# Patient Record
Sex: FEMALE | Race: WHITE | NOT HISPANIC OR LATINO | Employment: FULL TIME | URBAN - METROPOLITAN AREA
[De-identification: names, ages, dates, MRNs, and addresses within clinical notes are randomized per-mention and may not be internally consistent; named-entity substitution may affect disease eponyms.]

---

## 2021-08-25 ENCOUNTER — HOSPITAL ENCOUNTER (OUTPATIENT)
Dept: OTHER | Facility: HOSPITAL | Age: 41
Discharge: HOME | End: 2021-08-25

## 2023-03-25 ENCOUNTER — APPOINTMENT (OUTPATIENT)
Dept: MRI IMAGING | Facility: HOSPITAL | Age: 43
End: 2023-03-25
Payer: OTHER GOVERNMENT

## 2023-03-25 ENCOUNTER — HOSPITAL ENCOUNTER (OUTPATIENT)
Facility: HOSPITAL | Age: 43
Discharge: HOME | End: 2023-03-26
Attending: EMERGENCY MEDICINE | Admitting: STUDENT IN AN ORGANIZED HEALTH CARE EDUCATION/TRAINING PROGRAM
Payer: OTHER GOVERNMENT

## 2023-03-25 ENCOUNTER — APPOINTMENT (OUTPATIENT)
Dept: GENERAL RADIOLOGY | Facility: HOSPITAL | Age: 43
End: 2023-03-25
Payer: OTHER GOVERNMENT

## 2023-03-25 DIAGNOSIS — S76.111A QUADRICEPS TENDON RUPTURE, RIGHT, INITIAL ENCOUNTER: Primary | ICD-10-CM

## 2023-03-25 LAB
ANION GAP SERPL CALCULATED.3IONS-SCNC: 9.8 MMOL/L (ref 5–15)
BUN SERPL-MCNC: 10 MG/DL (ref 6–20)
BUN/CREAT SERPL: 12.8 (ref 7–25)
CALCIUM SPEC-SCNC: 9 MG/DL (ref 8.6–10.5)
CHLORIDE SERPL-SCNC: 101 MMOL/L (ref 98–107)
CO2 SERPL-SCNC: 24.2 MMOL/L (ref 22–29)
CREAT SERPL-MCNC: 0.78 MG/DL (ref 0.57–1)
DEPRECATED RDW RBC AUTO: 41.4 FL (ref 37–54)
EGFRCR SERPLBLD CKD-EPI 2021: 96.2 ML/MIN/1.73
ERYTHROCYTE [DISTWIDTH] IN BLOOD BY AUTOMATED COUNT: 11.9 % (ref 12.3–15.4)
GLUCOSE SERPL-MCNC: 140 MG/DL (ref 65–99)
HCT VFR BLD AUTO: 39 % (ref 34–46.6)
HGB BLD-MCNC: 13.9 G/DL (ref 12–15.9)
INR PPP: 1.04 (ref 0.86–1.15)
MCH RBC QN AUTO: 34.2 PG (ref 26.6–33)
MCHC RBC AUTO-ENTMCNC: 35.6 G/DL (ref 31.5–35.7)
MCV RBC AUTO: 95.8 FL (ref 79–97)
PLATELET # BLD AUTO: 289 10*3/MM3 (ref 140–450)
PMV BLD AUTO: 9.3 FL (ref 6–12)
POTASSIUM SERPL-SCNC: 3.7 MMOL/L (ref 3.5–5.2)
PROTHROMBIN TIME: 13.7 SECONDS (ref 11.8–14.9)
RBC # BLD AUTO: 4.07 10*6/MM3 (ref 3.77–5.28)
SODIUM SERPL-SCNC: 135 MMOL/L (ref 136–145)
WBC NRBC COR # BLD: 11.13 10*3/MM3 (ref 3.4–10.8)

## 2023-03-25 PROCEDURE — 96374 THER/PROPH/DIAG INJ IV PUSH: CPT

## 2023-03-25 PROCEDURE — G0378 HOSPITAL OBSERVATION PER HR: HCPCS

## 2023-03-25 PROCEDURE — 85610 PROTHROMBIN TIME: CPT | Performed by: STUDENT IN AN ORGANIZED HEALTH CARE EDUCATION/TRAINING PROGRAM

## 2023-03-25 PROCEDURE — 96361 HYDRATE IV INFUSION ADD-ON: CPT

## 2023-03-25 PROCEDURE — 96376 TX/PRO/DX INJ SAME DRUG ADON: CPT

## 2023-03-25 PROCEDURE — 99284 EMERGENCY DEPT VISIT MOD MDM: CPT

## 2023-03-25 PROCEDURE — 96375 TX/PRO/DX INJ NEW DRUG ADDON: CPT

## 2023-03-25 PROCEDURE — 25010000002 KETOROLAC TROMETHAMINE PER 15 MG: Performed by: STUDENT IN AN ORGANIZED HEALTH CARE EDUCATION/TRAINING PROGRAM

## 2023-03-25 PROCEDURE — 36415 COLL VENOUS BLD VENIPUNCTURE: CPT | Performed by: STUDENT IN AN ORGANIZED HEALTH CARE EDUCATION/TRAINING PROGRAM

## 2023-03-25 PROCEDURE — 80048 BASIC METABOLIC PNL TOTAL CA: CPT | Performed by: STUDENT IN AN ORGANIZED HEALTH CARE EDUCATION/TRAINING PROGRAM

## 2023-03-25 PROCEDURE — 73552 X-RAY EXAM OF FEMUR 2/>: CPT

## 2023-03-25 PROCEDURE — 25010000002 MORPHINE PER 10 MG: Performed by: EMERGENCY MEDICINE

## 2023-03-25 PROCEDURE — 25010000002 ONDANSETRON PER 1 MG: Performed by: EMERGENCY MEDICINE

## 2023-03-25 PROCEDURE — 85027 COMPLETE CBC AUTOMATED: CPT | Performed by: STUDENT IN AN ORGANIZED HEALTH CARE EDUCATION/TRAINING PROGRAM

## 2023-03-25 PROCEDURE — 73718 MRI LOWER EXTREMITY W/O DYE: CPT

## 2023-03-25 RX ORDER — NALOXONE HCL 0.4 MG/ML
0.4 VIAL (ML) INJECTION
Status: DISCONTINUED | OUTPATIENT
Start: 2023-03-25 | End: 2023-03-26

## 2023-03-25 RX ORDER — CYCLOBENZAPRINE HCL 10 MG
10 TABLET ORAL 3 TIMES DAILY PRN
Status: DISCONTINUED | OUTPATIENT
Start: 2023-03-25 | End: 2023-03-26 | Stop reason: HOSPADM

## 2023-03-25 RX ORDER — SODIUM CHLORIDE 0.9 % (FLUSH) 0.9 %
10 SYRINGE (ML) INJECTION EVERY 12 HOURS SCHEDULED
Status: DISCONTINUED | OUTPATIENT
Start: 2023-03-25 | End: 2023-03-26 | Stop reason: HOSPADM

## 2023-03-25 RX ORDER — ONDANSETRON 2 MG/ML
4 INJECTION INTRAMUSCULAR; INTRAVENOUS ONCE
Status: COMPLETED | OUTPATIENT
Start: 2023-03-25 | End: 2023-03-25

## 2023-03-25 RX ORDER — HYDROCODONE BITARTRATE AND ACETAMINOPHEN 5; 325 MG/1; MG/1
1 TABLET ORAL EVERY 4 HOURS PRN
Status: DISCONTINUED | OUTPATIENT
Start: 2023-03-25 | End: 2023-03-26 | Stop reason: HOSPADM

## 2023-03-25 RX ORDER — SODIUM CHLORIDE 0.9 % (FLUSH) 0.9 %
10 SYRINGE (ML) INJECTION AS NEEDED
Status: DISCONTINUED | OUTPATIENT
Start: 2023-03-25 | End: 2023-03-26 | Stop reason: HOSPADM

## 2023-03-25 RX ORDER — MORPHINE SULFATE 2 MG/ML
2 INJECTION, SOLUTION INTRAMUSCULAR; INTRAVENOUS EVERY 4 HOURS PRN
Status: DISCONTINUED | OUTPATIENT
Start: 2023-03-25 | End: 2023-03-26

## 2023-03-25 RX ORDER — ONDANSETRON 2 MG/ML
4 INJECTION INTRAMUSCULAR; INTRAVENOUS EVERY 6 HOURS PRN
Status: DISCONTINUED | OUTPATIENT
Start: 2023-03-25 | End: 2023-03-26 | Stop reason: HOSPADM

## 2023-03-25 RX ORDER — HYDROCODONE BITARTRATE AND ACETAMINOPHEN 5; 325 MG/1; MG/1
2 TABLET ORAL EVERY 4 HOURS PRN
Status: DISCONTINUED | OUTPATIENT
Start: 2023-03-25 | End: 2023-03-26 | Stop reason: HOSPADM

## 2023-03-25 RX ORDER — FAMOTIDINE 20 MG/1
40 TABLET, FILM COATED ORAL DAILY
Status: DISCONTINUED | OUTPATIENT
Start: 2023-03-25 | End: 2023-03-26 | Stop reason: HOSPADM

## 2023-03-25 RX ORDER — SODIUM CHLORIDE, SODIUM LACTATE, POTASSIUM CHLORIDE, CALCIUM CHLORIDE 600; 310; 30; 20 MG/100ML; MG/100ML; MG/100ML; MG/100ML
100 INJECTION, SOLUTION INTRAVENOUS CONTINUOUS
Status: DISCONTINUED | OUTPATIENT
Start: 2023-03-25 | End: 2023-03-26

## 2023-03-25 RX ORDER — SODIUM CHLORIDE 9 MG/ML
40 INJECTION, SOLUTION INTRAVENOUS AS NEEDED
Status: DISCONTINUED | OUTPATIENT
Start: 2023-03-25 | End: 2023-03-26 | Stop reason: HOSPADM

## 2023-03-25 RX ORDER — HYDROCODONE BITARTRATE AND ACETAMINOPHEN 7.5; 325 MG/1; MG/1
1 TABLET ORAL ONCE
Status: DISCONTINUED | OUTPATIENT
Start: 2023-03-25 | End: 2023-03-26 | Stop reason: HOSPADM

## 2023-03-25 RX ORDER — AMOXICILLIN 250 MG
1 CAPSULE ORAL NIGHTLY
Status: DISCONTINUED | OUTPATIENT
Start: 2023-03-25 | End: 2023-03-26 | Stop reason: HOSPADM

## 2023-03-25 RX ORDER — KETOROLAC TROMETHAMINE 15 MG/ML
15 INJECTION, SOLUTION INTRAMUSCULAR; INTRAVENOUS EVERY 6 HOURS PRN
Status: DISCONTINUED | OUTPATIENT
Start: 2023-03-25 | End: 2023-03-26 | Stop reason: HOSPADM

## 2023-03-25 RX ORDER — ACETAMINOPHEN 325 MG/1
650 TABLET ORAL EVERY 4 HOURS PRN
Status: DISCONTINUED | OUTPATIENT
Start: 2023-03-25 | End: 2023-03-26

## 2023-03-25 RX ORDER — DIPHENHYDRAMINE HCL 25 MG
25 CAPSULE ORAL NIGHTLY PRN
Status: DISCONTINUED | OUTPATIENT
Start: 2023-03-25 | End: 2023-03-26 | Stop reason: HOSPADM

## 2023-03-25 RX ADMIN — ONDANSETRON 4 MG: 2 INJECTION INTRAMUSCULAR; INTRAVENOUS at 17:33

## 2023-03-25 RX ADMIN — KETOROLAC TROMETHAMINE 15 MG: 15 INJECTION, SOLUTION INTRAMUSCULAR; INTRAVENOUS at 19:58

## 2023-03-25 RX ADMIN — MORPHINE SULFATE 4 MG: 4 INJECTION, SOLUTION INTRAMUSCULAR; INTRAVENOUS at 12:59

## 2023-03-25 RX ADMIN — FAMOTIDINE 40 MG: 20 TABLET, FILM COATED ORAL at 19:58

## 2023-03-25 RX ADMIN — ONDANSETRON 4 MG: 2 INJECTION INTRAMUSCULAR; INTRAVENOUS at 12:56

## 2023-03-25 RX ADMIN — SODIUM CHLORIDE, POTASSIUM CHLORIDE, SODIUM LACTATE AND CALCIUM CHLORIDE 100 ML/HR: 600; 310; 30; 20 INJECTION, SOLUTION INTRAVENOUS at 18:01

## 2023-03-25 RX ADMIN — Medication 10 ML: at 19:59

## 2023-03-25 NOTE — ED PROVIDER NOTES
"Time: 12:45 PM EDT  Date of encounter:  3/25/2023  Independent Historian/Clinical History and Information was obtained by:   Patient  Chief Complaint: Fall/Muscle Tear     History is limited by: N/A    History of Present Illness:  Patient is a 43 y.o. year old female who presents to the emergency department for evaluation after a fall earlier today. Pt was running for exercise earlier today when she felt a pop in her L hamstring and immediately felt pain in her R thigh and feeling \"balled up\". PT says that she feels like she tore a muscle in her R thigh.    HPI    Patient Care Team  Primary Care Provider: Maria Eugenia Bowles MD    Past Medical History:     No Known Allergies  History reviewed. No pertinent past medical history.  History reviewed. No pertinent surgical history.  History reviewed. No pertinent family history.    Home Medications:  Prior to Admission medications    Not on File        Social History:   Social History     Tobacco Use   • Smoking status: Never     Passive exposure: Never   • Smokeless tobacco: Never   Vaping Use   • Vaping Use: Never used   Substance Use Topics   • Alcohol use: Not Currently     Alcohol/week: 2.0 standard drinks     Types: 2 Glasses of wine per week   • Drug use: Defer         Review of Systems:  Review of Systems   Constitutional: Negative for chills and fever.   HENT: Negative for congestion, rhinorrhea and sore throat.    Eyes: Negative for photophobia.   Respiratory: Negative for apnea, cough, chest tightness and shortness of breath.    Cardiovascular: Negative for chest pain and palpitations.   Gastrointestinal: Negative for abdominal pain, diarrhea, nausea and vomiting.   Endocrine: Negative.    Genitourinary: Negative for difficulty urinating and dysuria.   Musculoskeletal: Negative for back pain, joint swelling and myalgias.   Skin: Negative for color change and wound.   Allergic/Immunologic: Negative.    Neurological: Negative for seizures and headaches. " "  Psychiatric/Behavioral: Negative.    All other systems reviewed and are negative.       Physical Exam:  /62 (BP Location: Right arm, Patient Position: Lying)   Pulse 50   Temp 98.2 °F (36.8 °C) (Oral)   Resp 16   Ht 162.6 cm (64\")   Wt 70.3 kg (155 lb)   LMP 03/25/2023   SpO2 99%   BMI 26.61 kg/m²     Physical Exam  Vitals and nursing note reviewed.   Constitutional:       General: She is awake.      Appearance: Normal appearance.   HENT:      Head: Normocephalic and atraumatic.      Nose: Nose normal.      Mouth/Throat:      Mouth: Mucous membranes are moist.   Eyes:      Extraocular Movements: Extraocular movements intact.      Pupils: Pupils are equal, round, and reactive to light.   Cardiovascular:      Rate and Rhythm: Normal rate and regular rhythm.      Heart sounds: Normal heart sounds.   Pulmonary:      Effort: Pulmonary effort is normal. No respiratory distress.      Breath sounds: Normal breath sounds. No wheezing, rhonchi or rales.   Abdominal:      General: Bowel sounds are normal.      Palpations: Abdomen is soft.      Tenderness: There is no abdominal tenderness. There is no guarding or rebound.      Comments: No rigidity   Musculoskeletal:         General: No swelling or tenderness. Normal range of motion.      Cervical back: Normal range of motion and neck supple.      Right upper leg: No bony tenderness.      Comments: Soft tissue deformity on distal thigh possible/suspected muscle tear   Skin:     General: Skin is warm and dry.      Coloration: Skin is not jaundiced.      Findings: Abrasion (L patella) present.      Comments: Abrasion on L patella with no bony tenderness    Neurological:      General: No focal deficit present.      Mental Status: She is alert and oriented to person, place, and time. Mental status is at baseline.      Sensory: Sensation is intact.      Motor: Motor function is intact.      Coordination: Coordination is intact.   Psychiatric:         Attention and " Perception: Attention and perception normal.         Mood and Affect: Mood and affect normal.         Speech: Speech normal.         Behavior: Behavior normal.         Judgment: Judgment normal.                  Procedures:  Procedures      Medical Decision Making:      Comorbidities that affect care:    None    External Notes reviewed:    None      The following orders were placed and all results were independently analyzed by me:  Orders Placed This Encounter   Procedures   • XR Femur 2 View Right   • XR Femur 2 View Left   • MRI Femur Right Without Contrast   • Basic Metabolic Panel   • CBC (No Diff)   • Protime-INR   • NPO Diet NPO Type: Strict NPO   • Diet: Regular/House Diet; Texture: Regular Texture (IDDSI 7); Fluid Consistency: Thin (IDDSI 0)   • Vital Signs   • Notify Provider (With Default Parameters)   • Intake & Output   • Weigh Patient   • Oral Care   • Saline Lock & Maintain IV Access   • Place Sequential Compression Device   • Maintain Sequential Compression Device   • Up With Assistance   • Neurovascular Checks   • Orthopedics (on-call MD unless specified)   • Oxygen Therapy- Nasal Cannula; Titrate for SPO2: 90% - 95%   • Insert Peripheral IV   • Insert Peripheral IV   • Initiate Observation Status       Medications Given in the Emergency Department:  Medications   sodium chloride 0.9 % flush 10 mL (has no administration in time range)   HYDROcodone-acetaminophen (NORCO) 7.5-325 MG per tablet 1 tablet (1 tablet Oral Not Given 3/25/23 1711)   sodium chloride 0.9 % flush 10 mL (has no administration in time range)   sodium chloride 0.9 % flush 10 mL (10 mL Intravenous Given 3/25/23 1959)   sodium chloride 0.9 % infusion 40 mL (has no administration in time range)   lactated ringers infusion (100 mL/hr Intravenous New Bag 3/25/23 1801)   acetaminophen (TYLENOL) tablet 650 mg (has no administration in time range)   morphine injection 2 mg (has no administration in time range)     And   naloxone (NARCAN)  injection 0.4 mg (has no administration in time range)   ondansetron (ZOFRAN) injection 4 mg (has no administration in time range)   diphenhydrAMINE (BENADRYL) capsule 25 mg (has no administration in time range)   HYDROcodone-acetaminophen (NORCO) 5-325 MG per tablet 1 tablet (has no administration in time range)   ketorolac (TORADOL) injection 15 mg (15 mg Intravenous Given 3/25/23 1958)   famotidine (PEPCID) tablet 40 mg (40 mg Oral Given 3/25/23 1958)   HYDROcodone-acetaminophen (NORCO) 5-325 MG per tablet 2 tablet (has no administration in time range)   sennosides-docusate (PERICOLACE) 8.6-50 MG per tablet 1 tablet (1 tablet Oral Not Given 3/25/23 2043)   cyclobenzaprine (FLEXERIL) tablet 10 mg (has no administration in time range)   morphine injection 4 mg (4 mg Intravenous Given 3/25/23 1259)   ondansetron (ZOFRAN) injection 4 mg (4 mg Intravenous Given 3/25/23 1256)   ondansetron (ZOFRAN) injection 4 mg (4 mg Intravenous Given 3/25/23 1733)        ED Course:    ED Course as of 03/25/23 2213   Sat Mar 25, 2023   1348 Images forwarded to Dr. Aguillon for orthopedic consultation. [RP]   1353 Case discussed with Dr. Aguillon.  He does recommend MRI of the affected right lower extremity.  Option for home with straight leg knee immobilizer versus inpatient if unable to ambulate.  Will reassess. [RP]      ED Course User Index  [RP] Sonu Bosch MD       Labs:    Lab Results (last 24 hours)     Procedure Component Value Units Date/Time    Basic Metabolic Panel [[id number]]  (Abnormal) Collected: 03/25/23 1745    Specimen: Blood Updated: 03/25/23 1819     Glucose 140 mg/dL      BUN 10 mg/dL      Creatinine 0.78 mg/dL      Sodium 135 mmol/L      Potassium 3.7 mmol/L      Chloride 101 mmol/L      CO2 24.2 mmol/L      Calcium 9.0 mg/dL      BUN/Creatinine Ratio 12.8     Anion Gap 9.8 mmol/L      eGFR 96.2 mL/min/1.73     Narrative:      GFR Normal >60  Chronic Kidney Disease <60  Kidney Failure <15      CBC (No Diff)  [[id number]]  (Abnormal) Collected: 03/25/23 1745    Specimen: Blood Updated: 03/25/23 1752     WBC 11.13 10*3/mm3      RBC 4.07 10*6/mm3      Hemoglobin 13.9 g/dL      Hematocrit 39.0 %      MCV 95.8 fL      MCH 34.2 pg      MCHC 35.6 g/dL      RDW 11.9 %      RDW-SD 41.4 fl      MPV 9.3 fL      Platelets 289 10*3/mm3     Protime-INR [[id number]]  (Normal) Collected: 03/25/23 1745    Specimen: Blood Updated: 03/25/23 1801     Protime 13.7 Seconds      INR 1.04    Narrative:      Suggested Therapeutic Ranges For Oral Anticoagulant Therapy:  Level of Therapy                      INR Target Range  Standard Dose                            2.0-3.0  High Dose                                2.5-3.5  Patients not receiving anticoagulant  Therapy Normal Range                     0.86-1.15           Imaging:    XR Femur 2 View Left    Result Date: 3/25/2023  PROCEDURE: XR FEMUR 2 VW LEFT  COMPARISON: [facility], , XR FEMUR 2 VW RIGHT, 3/25/2023, 13:07.  INDICATIONS: POST FALL, SUSPECTED LEFT HAMSTRING TEAR  FINDINGS:   Four views of both femurs were obtained.  No fractures are identified.  There are no lytic or sclerotic lesions.  IMPRESSION: No osseous abnormalities are identified in the left femur or right femur.   JILL SKINNER MD       Electronically Signed and Approved By: JILL SKINNER MD on 3/25/2023 at 13:24             XR Femur 2 View Right    Result Date: 3/25/2023  PROCEDURE: XR FEMUR 2 VW RIGHT  COMPARISON: [facility], , XR FEMUR 2 VW LEFT, 3/25/2023, 13:07.  INDICATIONS: POST FALL, SUSPECTED RIGHT QUADRICEP TEAR  FINDINGS:   Please see the report of the left femur performed the same day for a combined report.  JILL SKINNER MD       Electronically Signed and Approved By: JILL SKINNER MD on 3/25/2023 at 13:26             MRI Femur Right Without Contrast    Result Date: 3/25/2023  PROCEDURE: MRI FEMUR RIGHT WO CONTRAST  COMPARISON: [facility], , XR FEMUR 2  VW RIGHT, 3/25/2023, 13:07.  [facility], CR, XR FEMUR 2 VW LEFT, 3/25/2023, 13:07.  INDICATIONS: anterior to lateral mid shaft to right knee pain, posterior midshaft left thigh pain  TECHNIQUE: Multiplanar multisequence images of the right femur without contrast.   FINDINGS:  The left femur is visualized on the axial and coronal images of the right femur.  There appears to be left common hamstring origin tendon avulsion.  No evidence of marrow edema or fracture.  There is a right quadriceps tendon tear.  There appears to be a right VMO complex injury.  There is a myotendinous tear of the right rectus femoris muscle. The right ACL and PCL appear to be intact.  There is soft tissue swelling around the right knee.  Questionable undersurface tear of the posterior horn of the right medial meniscus.  The MCL and LCL complex appear to be intact.  IMPRESSION: 1. Right quadriceps tendon tear.  Myotendinous tear of the right rectus femoris muscle.  2. Left common hamstring tendon origin avulsion.  3. Questionable undersurface tear of the posterior horn of the right medial meniscus.   JILL SKINNER MD       Electronically Signed and Approved By: JILL SKINNER MD on 3/25/2023 at 16:35                 Differential Diagnosis and Discussion:    Extremity Pain: Differential diagnosis includes but is not limited to soft tissue sprain, tendonitis, tendon injury, dislocation, fracture, deep vein thrombosis, arterial insufficiency, osteoarthritis, bursitis, and ligamentous damage.    All X-rays were independently reviewed by me.    MDM         Patient Care Considerations:          Consultants/Shared Management Plan:    Consultant: I have discussed the case with Dr. Aguillon, orthopedics on-call who states Patient can be admitted under his name if she is unable to ambulate.    Social Determinants of Health:    Patient is independent, reliable, and has access to care.       Disposition and Care Coordination:    Admit:    Through independent evaluation of the patient's history, physical, and imperical data, the patient meets criteria for observation/admission to the hospital.        Final diagnoses:   Quadriceps tendon rupture, right, initial encounter        ED Disposition     ED Disposition   Decision to Admit    Condition   --    Comment   Level of Care: Med/Surg [1]   Diagnosis: Quadriceps tendon rupture, right, initial encounter [[id number]]               This medical record created using voice recognition software.           Michael Pinzon Jr.  03/25/23 1324       Sonu Bosch MD  03/25/23 2525

## 2023-03-25 NOTE — PLAN OF CARE
Goal Outcome Evaluation:   Patient arrived to 502, pain in bilateral legs when at rest of 5/10 sharp, constant, 10/10 when moving. Patient unable to move right leg R/T pain, however can move left. VSS. Alert and oriented. LR @100ml/hr.

## 2023-03-26 ENCOUNTER — READMISSION MANAGEMENT (OUTPATIENT)
Dept: CALL CENTER | Facility: HOSPITAL | Age: 43
End: 2023-03-26
Payer: OTHER GOVERNMENT

## 2023-03-26 ENCOUNTER — ANESTHESIA EVENT (OUTPATIENT)
Dept: PERIOP | Facility: HOSPITAL | Age: 43
End: 2023-03-26
Payer: OTHER GOVERNMENT

## 2023-03-26 ENCOUNTER — ANESTHESIA (OUTPATIENT)
Dept: PERIOP | Facility: HOSPITAL | Age: 43
End: 2023-03-26
Payer: OTHER GOVERNMENT

## 2023-03-26 VITALS
HEART RATE: 57 BPM | HEIGHT: 64 IN | BODY MASS INDEX: 26.46 KG/M2 | RESPIRATION RATE: 18 BRPM | OXYGEN SATURATION: 96 % | SYSTOLIC BLOOD PRESSURE: 108 MMHG | WEIGHT: 155 LBS | TEMPERATURE: 98 F | DIASTOLIC BLOOD PRESSURE: 57 MMHG

## 2023-03-26 LAB — B-HCG UR QL: NEGATIVE

## 2023-03-26 PROCEDURE — 99024 POSTOP FOLLOW-UP VISIT: CPT | Performed by: STUDENT IN AN ORGANIZED HEALTH CARE EDUCATION/TRAINING PROGRAM

## 2023-03-26 PROCEDURE — 25010000002 PROPOFOL 10 MG/ML EMULSION: Performed by: NURSE ANESTHETIST, CERTIFIED REGISTERED

## 2023-03-26 PROCEDURE — 25010000002 HYDROMORPHONE 1 MG/ML SOLUTION: Performed by: NURSE ANESTHETIST, CERTIFIED REGISTERED

## 2023-03-26 PROCEDURE — 25010000002 KETOROLAC TROMETHAMINE PER 15 MG: Performed by: STUDENT IN AN ORGANIZED HEALTH CARE EDUCATION/TRAINING PROGRAM

## 2023-03-26 PROCEDURE — 25010000002 KETOROLAC TROMETHAMINE PER 15 MG: Performed by: NURSE ANESTHETIST, CERTIFIED REGISTERED

## 2023-03-26 PROCEDURE — G0378 HOSPITAL OBSERVATION PER HR: HCPCS

## 2023-03-26 PROCEDURE — S0260 H&P FOR SURGERY: HCPCS | Performed by: STUDENT IN AN ORGANIZED HEALTH CARE EDUCATION/TRAINING PROGRAM

## 2023-03-26 PROCEDURE — 81025 URINE PREGNANCY TEST: CPT | Performed by: STUDENT IN AN ORGANIZED HEALTH CARE EDUCATION/TRAINING PROGRAM

## 2023-03-26 PROCEDURE — 25010000002 DEXAMETHASONE PER 1 MG: Performed by: NURSE ANESTHETIST, CERTIFIED REGISTERED

## 2023-03-26 PROCEDURE — 25010000002 MIDAZOLAM PER 1 MG: Performed by: NURSE ANESTHETIST, CERTIFIED REGISTERED

## 2023-03-26 PROCEDURE — 27385 REPAIR OF THIGH MUSCLE: CPT | Performed by: PHYSICIAN ASSISTANT

## 2023-03-26 PROCEDURE — 25010000002 CEFAZOLIN PER 500 MG: Performed by: NURSE ANESTHETIST, CERTIFIED REGISTERED

## 2023-03-26 PROCEDURE — 96361 HYDRATE IV INFUSION ADD-ON: CPT

## 2023-03-26 PROCEDURE — 96376 TX/PRO/DX INJ SAME DRUG ADON: CPT

## 2023-03-26 PROCEDURE — 25010000002 ONDANSETRON PER 1 MG: Performed by: STUDENT IN AN ORGANIZED HEALTH CARE EDUCATION/TRAINING PROGRAM

## 2023-03-26 PROCEDURE — 27385 REPAIR OF THIGH MUSCLE: CPT | Performed by: STUDENT IN AN ORGANIZED HEALTH CARE EDUCATION/TRAINING PROGRAM

## 2023-03-26 PROCEDURE — 25010000002 FENTANYL CITRATE (PF) 50 MCG/ML SOLUTION: Performed by: NURSE ANESTHETIST, CERTIFIED REGISTERED

## 2023-03-26 PROCEDURE — 25010000002 MORPHINE PER 10 MG: Performed by: STUDENT IN AN ORGANIZED HEALTH CARE EDUCATION/TRAINING PROGRAM

## 2023-03-26 DEVICE — KT BIOTENODESIS W/FW4PK: Type: IMPLANTABLE DEVICE | Site: FEMUR | Status: FUNCTIONAL

## 2023-03-26 DEVICE — SUT TP 1.7MM 38IN W/CUT NDL 1/2 CIR 76MM WHT/BLU: Type: IMPLANTABLE DEVICE | Site: FEMUR | Status: FUNCTIONAL

## 2023-03-26 RX ORDER — ONDANSETRON 2 MG/ML
4 INJECTION INTRAMUSCULAR; INTRAVENOUS ONCE AS NEEDED
Status: DISCONTINUED | OUTPATIENT
Start: 2023-03-26 | End: 2023-03-26 | Stop reason: HOSPADM

## 2023-03-26 RX ORDER — ONDANSETRON 4 MG/1
4 TABLET, FILM COATED ORAL EVERY 8 HOURS PRN
Qty: 30 TABLET | Refills: 0 | Status: SHIPPED | OUTPATIENT
Start: 2023-03-26 | End: 2023-03-26 | Stop reason: SDUPTHER

## 2023-03-26 RX ORDER — ACETAMINOPHEN 325 MG/1
650 TABLET ORAL EVERY 4 HOURS PRN
Status: DISCONTINUED | OUTPATIENT
Start: 2023-03-26 | End: 2023-03-26 | Stop reason: HOSPADM

## 2023-03-26 RX ORDER — KETAMINE HCL IN NACL, ISO-OSM 100MG/10ML
SYRINGE (ML) INJECTION AS NEEDED
Status: DISCONTINUED | OUTPATIENT
Start: 2023-03-26 | End: 2023-03-26 | Stop reason: SURG

## 2023-03-26 RX ORDER — FENTANYL CITRATE 50 UG/ML
INJECTION, SOLUTION INTRAMUSCULAR; INTRAVENOUS AS NEEDED
Status: DISCONTINUED | OUTPATIENT
Start: 2023-03-26 | End: 2023-03-26 | Stop reason: SURG

## 2023-03-26 RX ORDER — KETOROLAC TROMETHAMINE 30 MG/ML
INJECTION, SOLUTION INTRAMUSCULAR; INTRAVENOUS AS NEEDED
Status: DISCONTINUED | OUTPATIENT
Start: 2023-03-26 | End: 2023-03-26 | Stop reason: SURG

## 2023-03-26 RX ORDER — ONDANSETRON 4 MG/1
4 TABLET, FILM COATED ORAL EVERY 8 HOURS PRN
Qty: 30 TABLET | Refills: 0 | Status: SHIPPED | OUTPATIENT
Start: 2023-03-26

## 2023-03-26 RX ORDER — AMOXICILLIN 250 MG
1 CAPSULE ORAL NIGHTLY
Qty: 20 TABLET | Refills: 0 | Status: SHIPPED | OUTPATIENT
Start: 2023-03-26 | End: 2023-03-26 | Stop reason: SDUPTHER

## 2023-03-26 RX ORDER — CYCLOBENZAPRINE HCL 10 MG
10 TABLET ORAL 3 TIMES DAILY PRN
Qty: 30 TABLET | Refills: 0 | Status: SHIPPED | OUTPATIENT
Start: 2023-03-26 | End: 2023-03-26 | Stop reason: SDUPTHER

## 2023-03-26 RX ORDER — SODIUM CHLORIDE, SODIUM LACTATE, POTASSIUM CHLORIDE, CALCIUM CHLORIDE 600; 310; 30; 20 MG/100ML; MG/100ML; MG/100ML; MG/100ML
30 INJECTION, SOLUTION INTRAVENOUS CONTINUOUS
Status: DISCONTINUED | OUTPATIENT
Start: 2023-03-26 | End: 2023-03-26 | Stop reason: HOSPADM

## 2023-03-26 RX ORDER — KETOROLAC TROMETHAMINE 30 MG/ML
15 INJECTION, SOLUTION INTRAMUSCULAR; INTRAVENOUS EVERY 6 HOURS
Status: DISCONTINUED | OUTPATIENT
Start: 2023-03-26 | End: 2023-03-26 | Stop reason: HOSPADM

## 2023-03-26 RX ORDER — AMOXICILLIN 250 MG
1 CAPSULE ORAL NIGHTLY
Qty: 20 TABLET | Refills: 0 | Status: SHIPPED | OUTPATIENT
Start: 2023-03-26

## 2023-03-26 RX ORDER — CEFAZOLIN SODIUM 2 G/100ML
2 INJECTION, SOLUTION INTRAVENOUS
Status: DISCONTINUED | OUTPATIENT
Start: 2023-03-26 | End: 2023-03-26 | Stop reason: HOSPADM

## 2023-03-26 RX ORDER — CYCLOBENZAPRINE HCL 10 MG
10 TABLET ORAL 3 TIMES DAILY PRN
Qty: 30 TABLET | Refills: 0 | Status: SHIPPED | OUTPATIENT
Start: 2023-03-26 | End: 2023-04-05

## 2023-03-26 RX ORDER — SODIUM CHLORIDE, SODIUM LACTATE, POTASSIUM CHLORIDE, CALCIUM CHLORIDE 600; 310; 30; 20 MG/100ML; MG/100ML; MG/100ML; MG/100ML
100 INJECTION, SOLUTION INTRAVENOUS CONTINUOUS
Status: DISCONTINUED | OUTPATIENT
Start: 2023-03-26 | End: 2023-03-26 | Stop reason: HOSPADM

## 2023-03-26 RX ORDER — GLYCOPYRROLATE 0.2 MG/ML
INJECTION INTRAMUSCULAR; INTRAVENOUS AS NEEDED
Status: DISCONTINUED | OUTPATIENT
Start: 2023-03-26 | End: 2023-03-26 | Stop reason: SURG

## 2023-03-26 RX ORDER — OXYCODONE HYDROCHLORIDE 5 MG/1
5 TABLET ORAL
Status: DISCONTINUED | OUTPATIENT
Start: 2023-03-26 | End: 2023-03-26 | Stop reason: HOSPADM

## 2023-03-26 RX ORDER — ROCURONIUM BROMIDE 10 MG/ML
INJECTION, SOLUTION INTRAVENOUS AS NEEDED
Status: DISCONTINUED | OUTPATIENT
Start: 2023-03-26 | End: 2023-03-26 | Stop reason: SURG

## 2023-03-26 RX ORDER — NALOXONE HYDROCHLORIDE 4 MG/.1ML
SPRAY NASAL
Qty: 2 EACH | Refills: 0 | Status: SHIPPED | OUTPATIENT
Start: 2023-03-26

## 2023-03-26 RX ORDER — ONDANSETRON 4 MG/1
4 TABLET, FILM COATED ORAL EVERY 6 HOURS PRN
Status: DISCONTINUED | OUTPATIENT
Start: 2023-03-26 | End: 2023-03-26 | Stop reason: HOSPADM

## 2023-03-26 RX ORDER — MEPERIDINE HYDROCHLORIDE 25 MG/ML
12.5 INJECTION INTRAMUSCULAR; INTRAVENOUS; SUBCUTANEOUS
Status: DISCONTINUED | OUTPATIENT
Start: 2023-03-26 | End: 2023-03-26 | Stop reason: HOSPADM

## 2023-03-26 RX ORDER — DEXAMETHASONE SODIUM PHOSPHATE 4 MG/ML
INJECTION, SOLUTION INTRA-ARTICULAR; INTRALESIONAL; INTRAMUSCULAR; INTRAVENOUS; SOFT TISSUE AS NEEDED
Status: DISCONTINUED | OUTPATIENT
Start: 2023-03-26 | End: 2023-03-26 | Stop reason: SURG

## 2023-03-26 RX ORDER — LIDOCAINE HYDROCHLORIDE 20 MG/ML
INJECTION, SOLUTION EPIDURAL; INFILTRATION; INTRACAUDAL; PERINEURAL AS NEEDED
Status: DISCONTINUED | OUTPATIENT
Start: 2023-03-26 | End: 2023-03-26 | Stop reason: SURG

## 2023-03-26 RX ORDER — HYDROCODONE BITARTRATE AND ACETAMINOPHEN 5; 325 MG/1; MG/1
1 TABLET ORAL EVERY 4 HOURS PRN
Qty: 30 TABLET | Refills: 0 | Status: SHIPPED | OUTPATIENT
Start: 2023-03-26

## 2023-03-26 RX ORDER — PROMETHAZINE HYDROCHLORIDE 12.5 MG/1
25 TABLET ORAL ONCE AS NEEDED
Status: DISCONTINUED | OUTPATIENT
Start: 2023-03-26 | End: 2023-03-26 | Stop reason: HOSPADM

## 2023-03-26 RX ORDER — ONDANSETRON 2 MG/ML
4 INJECTION INTRAMUSCULAR; INTRAVENOUS EVERY 6 HOURS PRN
Status: DISCONTINUED | OUTPATIENT
Start: 2023-03-26 | End: 2023-03-26

## 2023-03-26 RX ORDER — SCOLOPAMINE TRANSDERMAL SYSTEM 1 MG/1
1 PATCH, EXTENDED RELEASE TRANSDERMAL CONTINUOUS
Status: DISCONTINUED | OUTPATIENT
Start: 2023-03-26 | End: 2023-03-26 | Stop reason: HOSPADM

## 2023-03-26 RX ORDER — CEFAZOLIN SODIUM 2 G/100ML
2 INJECTION, SOLUTION INTRAVENOUS EVERY 8 HOURS
Status: DISCONTINUED | OUTPATIENT
Start: 2023-03-26 | End: 2023-03-26 | Stop reason: HOSPADM

## 2023-03-26 RX ORDER — PROPOFOL 10 MG/ML
VIAL (ML) INTRAVENOUS AS NEEDED
Status: DISCONTINUED | OUTPATIENT
Start: 2023-03-26 | End: 2023-03-26 | Stop reason: SURG

## 2023-03-26 RX ORDER — HYDROCODONE BITARTRATE AND ACETAMINOPHEN 5; 325 MG/1; MG/1
1 TABLET ORAL EVERY 4 HOURS PRN
Qty: 30 TABLET | Refills: 0 | Status: SHIPPED | OUTPATIENT
Start: 2023-03-26 | End: 2023-03-26 | Stop reason: SDUPTHER

## 2023-03-26 RX ORDER — NALOXONE HCL 0.4 MG/ML
0.4 VIAL (ML) INJECTION
Status: DISCONTINUED | OUTPATIENT
Start: 2023-03-26 | End: 2023-03-26 | Stop reason: HOSPADM

## 2023-03-26 RX ORDER — FERROUS SULFATE 325(65) MG
325 TABLET ORAL
Status: DISCONTINUED | OUTPATIENT
Start: 2023-03-26 | End: 2023-03-26 | Stop reason: HOSPADM

## 2023-03-26 RX ORDER — PROMETHAZINE HYDROCHLORIDE 25 MG/1
25 SUPPOSITORY RECTAL ONCE AS NEEDED
Status: DISCONTINUED | OUTPATIENT
Start: 2023-03-26 | End: 2023-03-26 | Stop reason: HOSPADM

## 2023-03-26 RX ORDER — CEFAZOLIN SODIUM 1 G/3ML
INJECTION, POWDER, FOR SOLUTION INTRAMUSCULAR; INTRAVENOUS AS NEEDED
Status: DISCONTINUED | OUTPATIENT
Start: 2023-03-26 | End: 2023-03-26 | Stop reason: SURG

## 2023-03-26 RX ORDER — MIDAZOLAM HYDROCHLORIDE 1 MG/ML
INJECTION INTRAMUSCULAR; INTRAVENOUS AS NEEDED
Status: DISCONTINUED | OUTPATIENT
Start: 2023-03-26 | End: 2023-03-26 | Stop reason: SURG

## 2023-03-26 RX ORDER — ACETAMINOPHEN 650 MG/1
650 SUPPOSITORY RECTAL EVERY 4 HOURS PRN
Status: DISCONTINUED | OUTPATIENT
Start: 2023-03-26 | End: 2023-03-26 | Stop reason: HOSPADM

## 2023-03-26 RX ADMIN — SODIUM CHLORIDE, POTASSIUM CHLORIDE, SODIUM LACTATE AND CALCIUM CHLORIDE 100 ML/HR: 600; 310; 30; 20 INJECTION, SOLUTION INTRAVENOUS at 12:40

## 2023-03-26 RX ADMIN — CEFAZOLIN SODIUM 2 G: 1 INJECTION, POWDER, FOR SOLUTION INTRAMUSCULAR; INTRAVENOUS at 10:21

## 2023-03-26 RX ADMIN — CYCLOBENZAPRINE 10 MG: 10 TABLET, FILM COATED ORAL at 14:07

## 2023-03-26 RX ADMIN — FENTANYL CITRATE 50 MCG: 50 INJECTION, SOLUTION INTRAMUSCULAR; INTRAVENOUS at 10:17

## 2023-03-26 RX ADMIN — DEXAMETHASONE SODIUM PHOSPHATE 8 MG: 4 INJECTION, SOLUTION INTRA-ARTICULAR; INTRALESIONAL; INTRAMUSCULAR; INTRAVENOUS; SOFT TISSUE at 10:26

## 2023-03-26 RX ADMIN — MIDAZOLAM HYDROCHLORIDE 2 MG: 1 INJECTION, SOLUTION INTRAMUSCULAR; INTRAVENOUS at 10:05

## 2023-03-26 RX ADMIN — MORPHINE SULFATE 4 MG: 4 INJECTION, SOLUTION INTRAMUSCULAR; INTRAVENOUS at 12:40

## 2023-03-26 RX ADMIN — KETOROLAC TROMETHAMINE 15 MG: 15 INJECTION, SOLUTION INTRAMUSCULAR; INTRAVENOUS at 13:47

## 2023-03-26 RX ADMIN — Medication 10 ML: at 08:12

## 2023-03-26 RX ADMIN — SODIUM CHLORIDE, POTASSIUM CHLORIDE, SODIUM LACTATE AND CALCIUM CHLORIDE 100 ML/HR: 600; 310; 30; 20 INJECTION, SOLUTION INTRAVENOUS at 03:08

## 2023-03-26 RX ADMIN — SCOPALAMINE 1 PATCH: 1 PATCH, EXTENDED RELEASE TRANSDERMAL at 10:05

## 2023-03-26 RX ADMIN — FAMOTIDINE 40 MG: 20 TABLET, FILM COATED ORAL at 08:12

## 2023-03-26 RX ADMIN — HYDROCODONE BITARTRATE AND ACETAMINOPHEN 2 TABLET: 5; 325 TABLET ORAL at 12:40

## 2023-03-26 RX ADMIN — GLYCOPYRROLATE 0.2 MG: 0.2 INJECTION INTRAMUSCULAR; INTRAVENOUS at 10:05

## 2023-03-26 RX ADMIN — FERROUS SULFATE TAB 325 MG (65 MG ELEMENTAL FE) 325 MG: 325 (65 FE) TAB at 12:40

## 2023-03-26 RX ADMIN — ONDANSETRON 4 MG: 2 INJECTION INTRAMUSCULAR; INTRAVENOUS at 11:09

## 2023-03-26 RX ADMIN — SUGAMMADEX 200 MG: 100 INJECTION, SOLUTION INTRAVENOUS at 11:11

## 2023-03-26 RX ADMIN — LIDOCAINE HYDROCHLORIDE 100 MG: 20 INJECTION, SOLUTION EPIDURAL; INFILTRATION; INTRACAUDAL; PERINEURAL at 10:17

## 2023-03-26 RX ADMIN — ROCURONIUM BROMIDE 50 MG: 10 INJECTION, SOLUTION INTRAVENOUS at 10:18

## 2023-03-26 RX ADMIN — Medication 25 MG: at 10:22

## 2023-03-26 RX ADMIN — PROPOFOL 200 MG: 10 INJECTION, EMULSION INTRAVENOUS at 10:17

## 2023-03-26 RX ADMIN — FENTANYL CITRATE 50 MCG: 50 INJECTION, SOLUTION INTRAMUSCULAR; INTRAVENOUS at 11:17

## 2023-03-26 RX ADMIN — HYDROMORPHONE HYDROCHLORIDE 0.5 MG: 1 INJECTION, SOLUTION INTRAMUSCULAR; INTRAVENOUS; SUBCUTANEOUS at 12:12

## 2023-03-26 RX ADMIN — SODIUM CHLORIDE, POTASSIUM CHLORIDE, SODIUM LACTATE AND CALCIUM CHLORIDE: 600; 310; 30; 20 INJECTION, SOLUTION INTRAVENOUS at 10:43

## 2023-03-26 RX ADMIN — KETOROLAC TROMETHAMINE 30 MG: 30 INJECTION, SOLUTION INTRAMUSCULAR; INTRAVENOUS at 10:27

## 2023-03-26 NOTE — OUTREACH NOTE
Prep Survey    Flowsheet Row Responses   [facility] facility patient discharged from? [facility]   Is LACE score < 7 ? Yes   Eligibility Not Eligible   What are the reasons patient is not eligible? Other  [low risk for readmit]   Does the patient have one of the following disease processes/diagnoses(primary or secondary)? Other   Prep survey completed? Yes          Ginny BELTARN - Registered Nurse

## 2023-03-26 NOTE — ANESTHESIA PREPROCEDURE EVALUATION
Anesthesia Evaluation     Patient summary reviewed and Nursing notes reviewed   no history of anesthetic complications:  NPO Solid Status: > 8 hours  NPO Liquid Status: > 2 hours           Airway   Mallampati: I  TM distance: >3 FB  Neck ROM: full  No difficulty expected  Dental - normal exam     Pulmonary - negative pulmonary ROS and normal exam    breath sounds clear to auscultation  Cardiovascular - negative cardio ROS and normal exam  Exercise tolerance: good (4-7 METS)    Rhythm: regular  Rate: normal        Neuro/Psych- negative ROS  GI/Hepatic/Renal/Endo - negative ROS     Musculoskeletal (-) negative ROS    Abdominal    Substance History - negative use     OB/GYN negative ob/gyn ROS         Other - negative ROS       ROS/Med Hx Other: PAT Nursing Notes unavailable.                 Anesthesia Plan    ASA 1     general     (Patient understands anesthesia not responsible for dental damage.)  intravenous induction     Anesthetic plan, risks, benefits, and alternatives have been provided, discussed and informed consent has been obtained with: patient.  Pre-procedure education provided  Use of blood products discussed with patient  Consented to blood products.   Plan discussed with CRNA.        CODE STATUS:

## 2023-03-26 NOTE — OP NOTE
QUADRICEPS TENDON REPAIR  Procedure Report    Patient Name:  Breanna Corona  YOB: 1980    Date of Surgery:  3/26/2023     Indications: Breanna is a 43-year-old female who sustained a fall on 3/25 with a right quadriceps tendon rupture.  We discussed treatment options.  We discussed both operative and nonoperative management.  We discussed the risk, benefits, indications, and alternatives to a right quadriceps tendon repair.  She elected to proceed with surgery and consent was obtained.    Pre-op Diagnosis:   Quadriceps tendon rupture, right, initial encounter [S76.111A]       Post-Op Diagnosis Codes:     * Quadriceps tendon rupture, right, initial encounter [S76.111A]    Procedure/CPT® Codes:      Procedure(s):  QUADRICEPS TENDON REPAIR          Staff:  Surgeon(s):  Jaxon Aguillon MD    Assistant: VETO Lamar    Anesthesia: General with Block    Estimated Blood Loss: 50 mL    Implants:    Implant Name Type Inv. Item Serial No.  Lot No. LRB No. Used Action   SUT TP 1.7MM 38IN W/CUT NDL 1/2 CIR 76MM WHT/ECTOR - [id number] Implant SUT TP 1.7MM 38IN W/CUT NDL 1/2 CIR 76MM WHT/ECTOR  [id number] Right 2 Implanted   KT [employer] W/FW4PK - [id number] Implant KT BIOTENODESIS W/FW4PK  [employer] [id number] Right 1 Implanted   KT [employer]OTEN[employer] W/FW4PK - [id number] Implant KT BIOTENODESIS W/FW4PK  [employer] [id number] Right 1 Implanted       Specimen:          None        Findings: Full-thickness quadriceps tendon rupture    Complications: None    Description of Procedure: The patient was met in the preoperative holding area and the operative extremity was marked.  The patient was transferred to the operating room and laid supine on the operating table.  General anesthesia was induced.  2 g of preoperative Ancef were administered.  An upper thigh tourniquet was applied.  The right lower extremity was prepped and draped in the usual sterile fashion.  Timeout was taken ensure the appropriate  patient, procedure and procedural site.  All were in agreement.  The right lower extremity was exsanguinated and tourniquet was inflated to 300 mmHg.  I made a 8 cm longitudinal incision over the anterior knee.  Sharp dissection was carried down through the skin and subcutaneous tissues.  The quadriceps tendon rupture was identified.  This was full-thickness and extended into both the medial and lateral patellar retinacula.  The hemarthrosis was evacuated.  The knee was thoroughly irrigated with normal saline.  The tendon stump was debrided back to healthy appearing tissue.  The superior pole of the patella was debrided of tendon remnant in anticipation of repair.  I utilized the Ar[employer] 2.4 mm drill to place 3 antegrade drill holes through the patella in standard fashion.  I then utilized 1.7 mm suture tape in [place] fashion to place 2 a sending and descending limbs through the end of the quadriceps tendon.  Sutures were then shuttled through the drill holes.  The knee was brought into full extension.  Sutures were then tied distally with multiple locking half hitches.  Tendon reduced down to bone nicely with no palpable gaps at 45 degrees of knee flexion.  The knee was then thoroughly irrigated with normal saline.  The medial and lateral retinacular tissues were closed with #1 Ethibond.  Subcutaneous tissues were closed with 0 Vicryl and 2-0 Vicryl.  Skin was closed with skin staples.  The wound was dressed with Xeroform, soft roll, and an Ace wrap from the foot to the proximal thigh.  The tourniquet was released.  A knee immobilizer was applied.  All surgical counts were correct.  The patient was extubated and transferred to the recovery room under anesthesia guidance.    Assistant: VETO Lamar was present and her skilled assistance was necessary for patient positioning, retraction, suction, irrigation, suture management, closure, dressing application, brace application.      Jaxon Aguillon MD     Date:  3/26/2023  Time: 11:42 EDT

## 2023-03-26 NOTE — DISCHARGE INSTRUCTIONS
Orthopedic instructions: You may bear weight as tolerated on the left lower extremity.  Toe-touch weightbearing only on the right lower extremity.  Use your walker or crutches at all times.  Keep your knee immobilizer on and secured at all times.  You must maintain your knee in full extension at all times.  No knee flexion allowed on the right lower extremity.  Ice and elevate to help reduce pain and swelling.  You may remove the Ace wrap and gauze on postoperative day 2.  Keep your Aquacel dressing in place for 7 days.  You may shower over the Aquacel dressing.  Do not soak or scrub the bandage.  After 7 days, remove the Aquacel dressing and begin daily dressing changes to the right knee.  Monitor for increasing swelling, redness, drainage, fevers, chills.  Take Eliquis twice daily for DVT prophylaxis.  Once Eliquis is complete begin taking aspirin 325 mg daily.  Call the office with any concerns.  2-week follow-up for reevaluation.

## 2023-03-26 NOTE — NURSING NOTE
Ms. Corona, is post-op from Quadriceps tendon rupture repair. She has ambulated once, tolerating diet advancement yet complains of pain 10/10 in right leg. Patient requesting muscle relaxer in relation of muscle spasms.   Awaiting patient to ambulate second time and has flatus. Plan to get patient up to ambulate around 3pm.

## 2023-03-26 NOTE — ANESTHESIA POSTPROCEDURE EVALUATION
Patient: Breanna Corona    Procedure Summary     Date: 03/26/23 Room / Location: [facility] OR 03 / [facility] MAIN OR    Anesthesia Start: 1009 Anesthesia Stop: 1142    Procedure: QUADRICEPS TENDON REPAIR (Right: Thigh) Diagnosis:       Quadriceps tendon rupture, right, initial encounter      (Quadriceps tendon rupture, right, initial encounter [S76.111A])    Surgeons: Jaxon Aguillon MD Provider:     Anesthesia Type: general ASA Status: 1          Anesthesia Type: general    Vitals  Vitals Value Taken Time   /54 03/26/23 1224   Temp 36.7 °C (98 °F) 03/26/23 1224   Pulse 49 03/26/23 1225   Resp 16 03/26/23 1224   SpO2 96 % 03/26/23 1225   Vitals shown include unvalidated device data.        Post Anesthesia Care and Evaluation    Patient location during evaluation: bedside  Patient participation: complete - patient participated  Level of consciousness: awake  Pain management: adequate    Airway patency: patent  PONV Status: none  Cardiovascular status: acceptable and stable  Respiratory status: acceptable  Hydration status: acceptable    Comments: An Anesthesiologist personally participated in the most demanding procedures (including induction and emergence if applicable) in the anesthesia plan, monitored the course of anesthesia administration at frequent intervals and remained physically present and available for immediate diagnosis and treatment of emergencies.

## 2023-03-26 NOTE — H&P
[facility]   Consult Note    Patient Name: Breanna Corona  : 1980  MRN: [id number]  Primary Care Physician:  Maria Eugenia Bowles MD  Referring Physician: No ref. provider found  Date of admission: 3/25/2023    Subjective   Subjective     Chief Complaint: Bilateral leg pain, fall    HPI:  Breanna Corona is a 43 y.o. female who sustained a fall while running yesterday.  She reports she was sprinting and felt a pop in her left hamstring.  This caused her to fall onto her right knee.  She struck the anterior aspect of her right knee and felt a pop in her distal quad.  She was unable to ambulate after the fall.  She presented to [facility] ED.  X-rays were negative.  An MRI revealed a right quadriceps tendon tear and proximal hamstring tear on the left.  She was admitted given her inability to ambulate.  She currently denies any pain within the left hamstring and right quad.  She has bilateral knee abrasions.  She does not take any blood thinning medications regularly.    Review of Systems   14 Point ROS is negative except as noted above.     Personal History     History reviewed. No pertinent past medical history.    History reviewed. No pertinent surgical history.    Family History: family history is not on file. Otherwise pertinent FHx was reviewed and not pertinent to current issue.    Social History:  reports that she has never smoked. She has never been exposed to tobacco smoke. She has never used smokeless tobacco. She reports that she does not currently use alcohol after a past usage of about 2.0 standard drinks per week. Drug use questions deferred to the physician.    Home Medications:       Allergies:  No Known Allergies    Objective    Objective     Vitals:   Temp:  [97.9 °F (36.6 °C)-98.4 °F (36.9 °C)] 97.9 °F (36.6 °C)  Heart Rate:  [50-71] 58  Resp:  [16-18] 18  BP: (106-127)/(46-79) 114/52    Physical Exam:   Constitutional: Awake, alert   HENT: Atraumatic, Normocephalic   Respiratory:  Nonlabored respirations    Cardiovascular: Intact peripheral pulses    Musculoskeletal:     Right lower extremity: Abrasion over the anterior knee.  Palpable defect in the distal quadriceps tendon.  Moderate effusion.  Unable to maintain straight leg raise.  Knee stable to varus and valgus stress.  Nontender about the hip or thigh.  Calf soft and nontender distally.  Neurovascular intact with palpable pedal pulse.    Left lower extremity: Abrasion over the anterior knee.  Extensor mechanism intact.  No knee effusion.  Tender over the proximal hamstring.  Calf soft.  Distal neurovascular intact with palpable pedal pulse.     Imaging:  Imaging Results (Last 24 Hours)     Procedure Component Value Units Date/Time    MRI Femur Right Without Contrast [[id number]] Collected: 03/25/23 1635     Updated: 03/25/23 1638    Narrative:      PROCEDURE: MRI FEMUR RIGHT WO CONTRAST     COMPARISON: [facility], CR, XR FEMUR 2 VW RIGHT, 3/25/2023, 13:07.  [facility], CR, XR FEMUR 2 VW LEFT, 3/25/2023, 13:07.     INDICATIONS: anterior to lateral mid shaft to right knee pain, posterior midshaft left thigh pain     TECHNIQUE: Multiplanar multisequence images of the right femur without contrast.       FINDINGS:   The left femur is visualized on the axial and coronal images of the right femur.  There appears to   be left common hamstring origin tendon avulsion.  No evidence of marrow edema or fracture.  There   is a right quadriceps tendon tear.  There appears to be a right VMO complex injury.  There is a   myotendinous tear of the right rectus femoris muscle. The right ACL and PCL appear to be intact.    There is soft tissue swelling around the right knee.  Questionable undersurface tear of the   posterior horn of the right medial meniscus.  The MCL and LCL complex appear to be intact.     IMPRESSION:  1. Right quadriceps tendon tear.  Myotendinous tear of the right rectus femoris muscle.     2. Left common  hamstring tendon origin avulsion.     3. Questionable undersurface tear of the posterior horn of the right medial meniscus.       JILL SKINNER MD         Electronically Signed and Approved By: JILL SKINNER MD on 3/25/2023 at 16:35                     XR Femur 2 View Right [[id number]] Collected: 03/25/23 1325     Updated: 03/25/23 1329    Narrative:      PROCEDURE: XR FEMUR 2 VW RIGHT     COMPARISON: [facility], , XR FEMUR 2 VW LEFT, 3/25/2023, 13:07.     INDICATIONS: POST FALL, SUSPECTED RIGHT QUADRICEP TEAR     FINDINGS:      Please see the report of the left femur performed the same day for a combined report.     JILL SKINNER MD         Electronically Signed and Approved By: JILL SKINNER MD on 3/25/2023 at 13:26                     XR Femur 2 View Left [[id number]] Collected: 03/25/23 1324     Updated: 03/25/23 1327    Narrative:      PROCEDURE: XR FEMUR 2 VW LEFT     COMPARISON: [facility], , XR FEMUR 2 VW RIGHT, 3/25/2023, 13:07.     INDICATIONS: POST FALL, SUSPECTED LEFT HAMSTRING TEAR     FINDINGS:      Four views of both femurs were obtained.  No fractures are identified.  There are no lytic or   sclerotic lesions.     IMPRESSION:  No osseous abnormalities are identified in the left femur or right femur.       JILL SKINNER MD         Electronically Signed and Approved By: JILL SKINNER MD on 3/25/2023 at 13:24                            Result Review    Result Review:  I have personally reviewed the results from the time of this admission to 3/26/2023 07:14 EDT and agree with these findings:  [x]  Laboratory  []  Microbiology  [x]  Radiology  []  EKG/Telemetry   []  Cardiology/Vascular   []  Pathology  []  Old records  []  Other:      Assessment & Plan   Assessment / Plan     Brief Patient Summary:  Breanna Corona is a 43 y.o. female with a right quadriceps tendon tear and left proximal hamstring tear    Active Hospital Problems:  Active Hospital  Problems    Diagnosis    • **Quadriceps tendon rupture, right, initial encounter      Plan:     We discussed her injury and treatment options.  We discussed both operative and nonoperative management.  We discussed the risk, benefits, indications, and alternatives to a right quadriceps tendon repair.  We discussed the primary benefit of surgery is tendon reattachment.  We discussed surgery risk including bleeding, infection, damage to nerves and blood vessels, hardware complication, fracture, retear, stiffness, anesthesia risk including mortality, functional limitations, DVT/PE, and need for additional procedures.  She elected to proceed with surgical management.    We will plan nonoperative management of the left proximal hamstring avulsion.    N.p.o. for surgery today  Nonweightbearing right lower extremity  Ice and elevate  Preoperative antibiotics  Hold DVT chemoprophylaxis  Pain control    Electronically signed by Jaxon Aguillon MD, 03/26/23, 7:14 AM EDT.

## 2023-03-27 NOTE — DISCHARGE SUMMARY
[facility]         DISCHARGE SUMMARY    Patient Name: Breanna Corona  : 1980  MRN: [id number]    Date of Admission: 3/25/2023  Date of Discharge: 3/26/2023  Primary Care Physician: Maria Eugenia Bowles MD    Consults     No orders found from 2023 to 3/26/2023.          Presenting Problem:   Quadriceps tendon rupture, right, initial encounter [S76.111A]    Active and Resolved Hospital Problems:  Active Hospital Problems    Diagnosis POA   • **Quadriceps tendon rupture, right, initial encounter [S76.111A] Yes      Resolved Hospital Problems   No resolved problems to display.         Hospital Course     Hospital Course:  Breanna Corona is a 43 y.o. female who sustained a right quadriceps tendon rupture and left proximal hamstring avulsion on 3/25/2023.  She was admitted to the hospital due to difficulty with ambulation after her injury.  She underwent uncomplicated right quadriceps tendon repair on 3/26/2023.  Postoperatively her pain was well controlled, she demonstrated safety with ambulation using her crutches and knee immobilizer.  She voided difficulty.  She ultimately discharged home with oral pain medication and 2 weeks of Eliquis prescribed.        DISCHARGE Follow Up Recommendations: 2-week orthopedic follow-up for reevaluation.      Day of Discharge     Vital Signs:  Temp:  [97.4 °F (36.3 °C)-98 °F (36.7 °C)] 98 °F (36.7 °C)  Heart Rate:  [44-72] 57  Resp:  [14-18] 18  BP: (108-124)/(54-71) 108/57  Flow (L/min):  [8] 8  Physical Exam:  General: Alert, no acute distress  Cardiac: Intact peripheral pulses  Pulmonary nonlabored respirations  Right lower extremity: Intact surgical dressings.  No drainage.  Knee immobilizer in place.  Distal neurovascular intact palpable pedal pulse.      Pertinent  and/or Most Recent Results     LAB RESULTS:      Lab 23   WBC 11.13*   HEMOGLOBIN 13.9   HEMATOCRIT 39.0   PLATELETS 289   MCV 95.8   PROTIME 13.7         Lab 23    SODIUM 135*   POTASSIUM 3.7   CHLORIDE 101   CO2 24.2   ANION GAP 9.8   BUN 10   CREATININE 0.78   EGFR 96.2   GLUCOSE 140*   CALCIUM 9.0             Lab 03/25/23  1745   PROTIME 13.7   INR 1.04                 Brief Urine Lab Results  (Last result in the past 365 days)      Color   Clarity   Blood   Leuk Est   Nitrite   Protein   CREAT   Urine HCG        03/26/23 0928               Negative           Microbiology Results (last 10 days)     ** No results found for the last 240 hours. **                           Labs Pending at Discharge: None        Discharge Details        Discharge Medications      New Medications      Instructions Start Date   apixaban 2.5 MG tablet tablet  Commonly known as: ELIQUIS   2.5 mg, Oral, 2 Times Daily      cyclobenzaprine 10 MG tablet  Commonly known as: FLEXERIL   10 mg, Oral, 3 Times Daily PRN      HYDROcodone-acetaminophen 5-325 MG per tablet  Commonly known as: NORCO   1 tablet, Oral, Every 4 Hours PRN      naloxone 4 MG/0.1ML nasal spray  Commonly known as: NARCAN   CALL 911. Do not prime. Spray into nostril upon signs of opioid overdose. May repeat in 2 to 3 minutes in opposite nostril if no or minimal breathing and responsiveness, then as needed (if doses are available) every 2 to 3 minutes.      ondansetron 4 MG tablet  Commonly known as: Zofran   4 mg, Oral, Every 8 Hours PRN      sennosides-docusate 8.6-50 MG per tablet  Commonly known as: PERICOLACE   1 tablet, Oral, Nightly             No Known Allergies      Discharge Disposition:  Home or Self Care    Diet:  Hospital:No active diet order        Discharge Activity:   Activity Instructions    Do not stand on right leg.              CODE STATUS:  There are no questions and answers to display.         No future appointments.    Additional Instructions for the Follow-ups that You Need to Schedule     Discharge Follow-up with Specified Provider: Jaxon Aguillon MD; 2 Weeks   As directed      To: Jaxon Aguillon MD    Follow Up:  2 Weeks    Follow Up Details: Call to confirm appointment               Electronically signed by Jaxon Aguillon MD, 03/27/23, 7:52 AM EDT.

## 2023-03-30 ENCOUNTER — TELEPHONE (OUTPATIENT)
Dept: ORTHOPEDIC SURGERY | Facility: CLINIC | Age: 43
End: 2023-03-30
Payer: OTHER GOVERNMENT

## 2023-03-30 DIAGNOSIS — S76.111A QUADRICEPS TENDON RUPTURE, RIGHT, INITIAL ENCOUNTER: Primary | ICD-10-CM

## 2023-03-30 NOTE — TELEPHONE ENCOUNTER
Caller: CHLOE   Relationship to Patient: SELF   Phone Number: [phone]   Reason for Call: PATIENT CALLING ASKING FOR PT ORDERS, THEY NEED TO GET AUTH FROM [facility]

## 2023-03-30 NOTE — TELEPHONE ENCOUNTER
Spoke with patient, she will start physical therapy 2 weeks after surgery. Order is placed, she would like to go to [facility] and needs a  referral. Can you please work of referral for Pt

## 2023-04-10 ENCOUNTER — OFFICE VISIT (OUTPATIENT)
Dept: ORTHOPEDIC SURGERY | Facility: CLINIC | Age: 43
End: 2023-04-10
Payer: OTHER GOVERNMENT

## 2023-04-10 VITALS — WEIGHT: 155 LBS | BODY MASS INDEX: 26.46 KG/M2 | HEIGHT: 64 IN

## 2023-04-10 DIAGNOSIS — S76.111D RUPTURE OF RIGHT QUADRICEPS TENDON, SUBSEQUENT ENCOUNTER: Primary | ICD-10-CM

## 2023-04-10 PROCEDURE — 99024 POSTOP FOLLOW-UP VISIT: CPT | Performed by: PHYSICIAN ASSISTANT

## 2023-04-10 NOTE — PROGRESS NOTES
"Chief Complaint  Pain and Follow-up of the Right Knee and Suture / Staple Removal    Subjective          Breanna Corona presents to [facility]   History of Present Illness    Breanna Corona presents today for a follow-up of her right knee.  Patient is 2 weeks postop right quadriceps tendon repair performed by Dr. Aguillon on 3/26/2023.  Today, patient states that she is doing okay.  She reports no pain.  She is no longer taking any narcotic medications.  She is scheduled to begin formal physical therapy on Wednesday.  She has remained in her knee immobilizer and nonweightbearing since surgery.  She denies fever or chills.  Denies complications, redness, or drainage from her incision.  She has been doing some gentle ankle range of motion exercises.  She is currently on Eliquis for DVT prophylaxis.  Denies lower extremity numbness or tingling.  She affirms swelling.      No Known Allergies     Social History     Socioeconomic History   • Marital status:    Tobacco Use   • Smoking status: Never     Passive exposure: Never   • Smokeless tobacco: Never   Vaping Use   • Vaping Use: Never used   Substance and Sexual Activity   • Alcohol use: Not Currently     Alcohol/week: 2.0 standard drinks     Types: 2 Glasses of wine per week   • Drug use: Defer   • Sexual activity: Defer        I reviewed the patient's chief complaint, history of present illness, review of systems, past medical history, surgical history, family history, social history, medications, and allergy list.     REVIEW OF SYSTEMS    Constitutional: Denies fevers, chills, weight loss  Cardiovascular: Denies chest pain, shortness of breath  Skin: Denies rashes, acute skin changes  Neurologic: Denies headache, loss of consciousness  MSK: Right knee pain      Objective   Vital Signs:   Ht 162.6 cm (64\")   Wt 70.3 kg (155 lb)   BMI 26.61 kg/m²     Body mass index is 26.61 kg/m².    Physical Exam    General: Alert. No " acute distress.   Right lower extremity: Staples removed today without complications.  Incision is well-healing with no active drainage or redness.  No concerning signs for infection.  Knee stable to varus and valgus stress.  Calf soft, nontender.  Demonstrates active ankle plantarflexion and dorsiflexion.  Palpable pedal pulses.    Procedures    Imaging Results (Most Recent)     None                 Assessment and Plan    Diagnoses and all orders for this visit:    1. Rupture of right quadriceps tendon, subsequent encounter (Primary)        Breanna Corona presents today 2 weeks postop right quadriceps tendon repair performed by Dr. Aguillon on 3/26/2023.  Staples removed today without complications.  Incision is well-healing with no concerning signs for infection.  Patient denies fever or chills. Incision site care was reviewed today. Patient instructed not to soak or submerge incision. Do not apply any lotions, creams, or ointments to the incision at this time.  We discussed concerning signs and symptoms regarding the incision site.  Patient understood and agreed.  Patient scheduled to begin formal physical therapy on Wednesday.  We discussed the quadriceps tendon repair protocol.  Patient will follow these guidelines.  She was placed in a hinged knee brace today, locked in full extension.  She instructed to gradually increase to toe-touch weightbearing with the knee brace locked in extension using crutches for assistance.  Patient will increase weightbearing as tolerated with formal physical therapy as she progresses.  We discussed the importance of working on some knee range of motion but not obtaining any knee flexion past 90 degrees until at least 6 weeks postop.  We discussed no active quadriceps extension at this time.  Patient expressed understanding.  Patient will finish Eliquis today and begin aspirin for DVT prophylaxis.  Use ice and elevation as needed for inflammation.      Patient will follow up in  4 weeks for reevaluation.        Call or return if symptoms worsen or patient has any concerns.       Follow Up   Return in about 4 weeks (around 5/8/2023).  Patient was given instructions and counseling regarding her condition or for health maintenance advice. Please see specific information pulled into the AVS if appropriate.     Clau Gutierrez PA-C  04/10/23  10:30 EDT

## 2023-05-12 ENCOUNTER — TELEPHONE (OUTPATIENT)
Dept: ORTHOPEDIC SURGERY | Facility: CLINIC | Age: 43
End: 2023-05-12

## 2023-05-12 ENCOUNTER — OFFICE VISIT (OUTPATIENT)
Dept: ORTHOPEDIC SURGERY | Facility: CLINIC | Age: 43
End: 2023-05-12
Payer: OTHER GOVERNMENT

## 2023-05-12 VITALS — BODY MASS INDEX: 26.46 KG/M2 | HEIGHT: 64 IN | WEIGHT: 155 LBS

## 2023-05-12 DIAGNOSIS — S76.111D RUPTURE OF RIGHT QUADRICEPS TENDON, SUBSEQUENT ENCOUNTER: Primary | ICD-10-CM

## 2023-05-12 NOTE — PROGRESS NOTES
"Chief Complaint  Follow-up of the Right Knee    Subjective          Breanna Corona presents to [facility]   History of Present Illness    Breanna Corona presents today for a follow-up of her right knee.  Patient is 7 weeks postop right quadriceps tendon repair performed on 3/26/2023.  Today, patient states that she is doing well.  She has been attending physical therapy and doing her home exercises.  She reports improving range of motion.  She did begin light strengthening exercises week without complication.  She reports continued knee flexion.  She states her incision is well-healed with no concerning signs for infection.  She denies new injuries.  Denies lower extremity numbness or tingling.  She is continue with her hinged knee brace for ambulation and mobility of the time.      No Known Allergies     Social History     Socioeconomic History   • Marital status:    Tobacco Use   • Smoking status: Never     Passive exposure: Never   • Smokeless tobacco: Never   Vaping Use   • Vaping Use: Never used   Substance and Sexual Activity   • Alcohol use: Not Currently     Alcohol/week: 2.0 standard drinks     Types: 2 Glasses of wine per week   • Drug use: Defer   • Sexual activity: Defer        I reviewed the patient's chief complaint, history of present illness, review of systems, past medical history, surgical history, family history, social history, medications, and allergy list.     REVIEW OF SYSTEMS    Constitutional: Denies fevers, chills, weight loss  Cardiovascular: Denies chest pain, shortness of breath  Skin: Denies rashes, acute skin changes  Neurologic: Denies headache, loss of consciousness  MSK: Right knee pain      Objective   Vital Signs:   Ht 162.6 cm (64\")   Wt 70.3 kg (155 lb)   BMI 26.61 kg/m²     Body mass index is 26.61 kg/m².    Physical Exam    General: Alert. No acute distress.   Right lower extremity: Incision is well-healed.  No concerning signs for " infection.  No knee effusion.  Full knee extension.  Knee flexion 105. Able to activate quad. Quad atrophy noted. Knee extensor mechanism intact.  Nontender to the medial or lateral joint lines.  Knee stable to varus and valgus stress.  Calf soft, nontender.  Demonstrates active ankle plantarflexion dorsiflexion.  Sensation intact over the dorsal and plantar foot.  Palpable pedal pulses.    Procedures    Imaging Results (Most Recent)     None                 Assessment and Plan    Diagnoses and all orders for this visit:    1. Rupture of right quadriceps tendon, subsequent encounter (Primary)        Breanna Corona presents today 6 weeks postop right quadriceps tendon repair performed on 3/26/2023.  Incision is well-healed with no concerning signs for infection.  Patient instructed to continue with formal physical therapy as well as her home exercises.  We discussed continuing the quadriceps tendon repair protocol.  Patient expressed understanding and agreed.  Patient was given a playmaker brace today. Quad atrophy noted with quad to calf ratio. Okay to transition from her hinged knee brace to a playmaker brace as tolerated.  He denies elevation as needed for inflammation.      Patient will follow up in 6 weeks for reevaluation.        Call or return if symptoms worsen or patient has any concerns.       Follow Up   Return in about 6 weeks (around 6/23/2023).  Patient was given instructions and counseling regarding her condition or for health maintenance advice. Please see specific information pulled into the AVS if appropriate.     Clau Gutierrez PA-C  05/12/23  11:17 EDT

## 2023-05-12 NOTE — TELEPHONE ENCOUNTER
Provider: JOSEPH DAMON    Caller: ASMITA    Relationship to Patient: [facility]DS    Phone Number: [phone]    Reason for Call: ONLY RECEIVED 8 OF 9 PAGES FROM OFFICE NOTES 5/12.

## 2023-06-19 ENCOUNTER — OFFICE VISIT (OUTPATIENT)
Dept: ORTHOPEDIC SURGERY | Facility: CLINIC | Age: 43
End: 2023-06-19
Payer: OTHER GOVERNMENT

## 2023-06-19 VITALS — OXYGEN SATURATION: 99 % | HEART RATE: 73 BPM | HEIGHT: 64 IN | WEIGHT: 154.98 LBS | BODY MASS INDEX: 26.46 KG/M2

## 2023-06-19 DIAGNOSIS — S76.111D RUPTURE OF RIGHT QUADRICEPS TENDON, SUBSEQUENT ENCOUNTER: Primary | ICD-10-CM

## 2023-06-19 NOTE — PROGRESS NOTES
Chief Complaint  Pain and Follow-up of the Right Knee    Subjective          Breanna Corona presents to [facility]   History of Present Illness    Breanna Corona presents today for a follow-up of her right knee.  Patient is 3 months postop right quadriceps tendon repair performed on 3/26/2023.  Today, patient states that she is doing well.  She has continued physical therapy and continues to make progression.  She states that she wears her brace at times, typically with yard work or other activities.  She has been progressing with stairs and lunges.  She states that she began some jumping today in physical therapy without complications.  She does experience some stiffness after sitting with her knee flexed for an extended period of time.  She will experience swelling after being on her feet for an extended period of time.  She does describe some catching with certain twisting motions on occasion.  She reports no new injuries.  She states that her strength continues to improve.      No Known Allergies     Social History     Socioeconomic History   • Marital status:    Tobacco Use   • Smoking status: Never     Passive exposure: Never   • Smokeless tobacco: Never   Vaping Use   • Vaping Use: Never used   Substance and Sexual Activity   • Alcohol use: Not Currently     Alcohol/week: 2.0 standard drinks     Types: 2 Glasses of wine per week   • Drug use: Defer   • Sexual activity: Defer        I reviewed the patient's chief complaint, history of present illness, review of systems, past medical history, surgical history, family history, social history, medications, and allergy list.     REVIEW OF SYSTEMS    Constitutional: Denies fevers, chills, weight loss  Cardiovascular: Denies chest pain, shortness of breath  Skin: Denies rashes, acute skin changes  Neurologic: Denies headache, loss of consciousness  MSK: Right knee pain      Objective   Vital Signs:   Pulse 73   Ht 162.6 cm  "(64\")   Wt 70.3 kg (154 lb 15.7 oz)   SpO2 99%   BMI 26.60 kg/m²     Body mass index is 26.6 kg/m².    Physical Exam    General: Alert. No acute distress.   Right lower extremity: Well-healed incision.  No concerning signs for infection.  No knee effusion.  Nontender to medial or lateral joint lines.  Full knee extension.  Knee flexion 120.  Knee extensor mechanism intact.  Able to activate quad muscle.  Knee stable to varus and valgus stress.  Calf soft, nontender.  Demonstrates active ankle plantarflexion and dorsiflexion.  Sensation intact over the dorsal and plantar foot.  Palpable pedal pulses.    Procedures    Imaging Results (Most Recent)     None                 Assessment and Plan    Diagnoses and all orders for this visit:    1. Rupture of right quadriceps tendon, subsequent encounter (Primary)        Breanna Corona presents today 3 months postop right quadriceps tendon repair performed on 3/26/2023.  Incision is well-healed with no concerning signs for infection.  Patient instructed to continue with formal physical therapy as well as her home exercises.  We discussed gradual progression with activity, continuing to follow-up with the quadriceps tendon repair protocol.  Patient expressed understanding and agreed.  Continue with playmaker brace as needed.  Use ice or elevation as needed for inflammation.      Patient will follow up in 6 weeks for reevaluation.      Call or return if symptoms worsen or patient has any concerns.       Follow Up   Return in about 6 weeks (around 7/31/2023).  Patient was given instructions and counseling regarding her condition or for health maintenance advice. Please see specific information pulled into the AVS if appropriate.     Clau Gutierrez PA-C  06/19/23  15:56 EDT        "

## 2023-12-04 ENCOUNTER — OFFICE VISIT (OUTPATIENT)
Dept: ORTHOPEDIC SURGERY | Facility: CLINIC | Age: 43
End: 2023-12-04
Payer: OTHER GOVERNMENT

## 2023-12-04 VITALS
WEIGHT: 155 LBS | HEIGHT: 64 IN | HEART RATE: 70 BPM | DIASTOLIC BLOOD PRESSURE: 85 MMHG | OXYGEN SATURATION: 98 % | BODY MASS INDEX: 26.46 KG/M2 | SYSTOLIC BLOOD PRESSURE: 147 MMHG

## 2023-12-04 DIAGNOSIS — Z47.89 ORTHOPEDIC AFTERCARE: Primary | ICD-10-CM

## 2023-12-04 PROCEDURE — 99213 OFFICE O/P EST LOW 20 MIN: CPT | Performed by: STUDENT IN AN ORGANIZED HEALTH CARE EDUCATION/TRAINING PROGRAM

## 2023-12-04 NOTE — PROGRESS NOTES
"Chief Complaint  Follow-up and Pain of the Right Knee    Subjective          Breanna Corona presents to [facility] for   History of Present Illness    The patient presents here today for follow up evaluation of the right knee.  Patient is postop right quadriceps tendon repair performed on 3/26/2023. She reports she is having some popping in her knee. She gets swelling at times. She does have anterior knee pain with extension.       No Known Allergies     Social History     Socioeconomic History    Marital status:    Tobacco Use    Smoking status: Never     Passive exposure: Never    Smokeless tobacco: Never   Vaping Use    Vaping Use: Never used   Substance and Sexual Activity    Alcohol use: Not Currently     Alcohol/week: 2.0 standard drinks of alcohol     Types: 2 Glasses of wine per week    Drug use: Defer    Sexual activity: Defer        I reviewed the patient's chief complaint, history of present illness, review of systems, past medical history, surgical history, family history, social history, medications, and allergy list.     REVIEW OF SYSTEMS    Constitutional: Denies fevers, chills, weight loss  Cardiovascular: Denies chest pain, shortness of breath  Skin: Denies rashes, acute skin changes  Neurologic: Denies headache, loss of consciousness  MSK: Right knee pain      Objective   Vital Signs:   /85   Pulse 70   Ht 162.6 cm (64\")   Wt 70.3 kg (155 lb)   SpO2 98%   BMI 26.61 kg/m²     Body mass index is 26.61 kg/m².    Physical Exam    General: Alert. No acute distress.   Right knee- full extension. No lag. 5/5 knee extension. Flexion 120 degrees. Stable to varus/valgus stress. Stable to anterior/posterior drawer. Negative Lachman's. Medial tenderness. Intact ankle plantar flexion and dorsiflexion. Positive EHL, FHL, GS and TA. Sensation intact to all 5 nerves of the foot. Positive pulses. Well healed scar. Pain with Alanna's.     Procedures    Imaging " Results (Most Recent)       None                     Assessment and Plan        No results found.     Diagnoses and all orders for this visit:    1. S/P right quadriceps tendon repair performed on 3/26/2023 (Primary)  -     MRI Knee Right Without Contrast; Future        Discussed the treatment plan with the patient.  Plan for repeat MRI of the right knee to re-evaluate the meniscus. The patient expressed understanding and wished to proceed. Plan to continue home exercises      Call or return if worsening symptoms.    Scribed for Jaxon Aguillon MD by Nae Espitia  12/04/2023   10:55 EST         Follow Up       MRI results.     Patient was given instructions and counseling regarding her condition or for health maintenance advice. Please see specific information pulled into the AVS if appropriate.       I have personally performed the services described in this document as scribed by the above individual and it is both accurate and complete.     Jaxon Aguillon MD  12/04/23  11:07 EST

## 2023-12-14 ENCOUNTER — HOSPITAL ENCOUNTER (OUTPATIENT)
Dept: MRI IMAGING | Facility: HOSPITAL | Age: 43
Discharge: HOME | End: 2023-12-14
Admitting: STUDENT IN AN ORGANIZED HEALTH CARE EDUCATION/TRAINING PROGRAM
Payer: OTHER GOVERNMENT

## 2023-12-14 DIAGNOSIS — Z47.89 ORTHOPEDIC AFTERCARE: ICD-10-CM

## 2023-12-14 PROCEDURE — 73721 MRI JNT OF LWR EXTRE W/O DYE: CPT

## 2023-12-15 ENCOUNTER — TELEPHONE (OUTPATIENT)
Dept: ORTHOPEDIC SURGERY | Facility: CLINIC | Age: 43
End: 2023-12-15
Payer: OTHER GOVERNMENT

## 2023-12-22 ENCOUNTER — OFFICE VISIT (OUTPATIENT)
Dept: ORTHOPEDIC SURGERY | Facility: CLINIC | Age: 43
End: 2023-12-22
Payer: OTHER GOVERNMENT

## 2023-12-22 VITALS
DIASTOLIC BLOOD PRESSURE: 77 MMHG | WEIGHT: 155 LBS | OXYGEN SATURATION: 97 % | SYSTOLIC BLOOD PRESSURE: 118 MMHG | BODY MASS INDEX: 26.46 KG/M2 | HEIGHT: 64 IN | HEART RATE: 61 BPM

## 2023-12-22 DIAGNOSIS — M94.261 CHONDROMALACIA OF RIGHT KNEE: ICD-10-CM

## 2023-12-22 DIAGNOSIS — Z47.89 ORTHOPEDIC AFTERCARE: Primary | ICD-10-CM

## 2023-12-22 NOTE — PROGRESS NOTES
"Chief Complaint  Follow-up and Pain of the Right Knee    Subjective          Breanna Corona presents to [facility] for   History of Present Illness    Breanna returns for follow-up of her right knee.  She had a quadriceps tendon repair performed on 3/26/2023.  She has progressed well with PT but had persistent pain.  An MRI was obtained.    No Known Allergies     Social History     Socioeconomic History    Marital status:    Tobacco Use    Smoking status: Never     Passive exposure: Never    Smokeless tobacco: Never   Vaping Use    Vaping Use: Never used   Substance and Sexual Activity    Alcohol use: Not Currently     Alcohol/week: 2.0 standard drinks of alcohol     Types: 2 Glasses of wine per week    Drug use: Defer    Sexual activity: Defer        I reviewed the patient's chief complaint, history of present illness, review of systems, past medical history, surgical history, family history, social history, medications, and allergy list.     REVIEW OF SYSTEMS    Constitutional: Denies fevers, chills, weight loss  Cardiovascular: Denies chest pain, shortness of breath  Skin: Denies rashes, acute skin changes  Neurologic: Denies headache, loss of consciousness  MSK: Right knee pain      Objective   Vital Signs:   /77   Pulse 61   Ht 162.6 cm (64\")   Wt 70.3 kg (155 lb)   SpO2 97%   BMI 26.61 kg/m²     Body mass index is 26.61 kg/m².    Physical Exam    General: Alert. No acute distress.   Right lower extremity: full extension. No lag. 5/5 knee extension. Flexion 120 degrees. Stable to varus/valgus stress. Stable to anterior/posterior drawer. Negative Lachman's. Medial tenderness. Intact ankle plantar flexion and dorsiflexion. Positive EHL, FHL, GS and TA. Sensation intact to all 5 nerves of the foot. Positive pulses. Well healed scar.     Procedures    Imaging Results (Most Recent)       None                     Assessment and Plan        MRI Knee Right Without " Contrast    Result Date: 12/14/2023  Narrative: PROCEDURE: MRI KNEE RIGHT  WO CONTRAST  COMPARISON: [facility], MR, MRI FEMUR RIGHT WO CONTRAST, 3/25/2023, 15:19.  INDICATIONS: RIGHT KNEE SWELLING WITH CLICKING AND POPPING SENSATION. STATUS POST QUADRICEP TENDON AND MENISCUS REPAIR 03/2023.      TECHNIQUE: A complete multi-planar MRI was performed.   FINDINGS:  Bone marrow signal intensity is normal.  No osseous contusions or fractures.  No aggressive osseous lesions.  Physiologic joint fluid.  No intra-articular loose body.  There is mild medial compartment joint space narrowing.  The anterior cruciate ligament posterior cruciate ligament are intact.  The medial collateral ligament and lateral collateral ligament complex are intact.  There are no meniscal tears.  Patella has anatomic position.  There is evidence of previous quadriceps tendon repair with mild thickening distal quadriceps tendon but no defect identified.  The patellar tendon is thickened but intact.  There is diffuse thinning of cartilage in the medial compartment.  There is partial-thickness cartilage loss at the medial patellar facet.  There is no Chang cyst.  No evidence of recent Baker cyst rupture.  The popliteus muscle and tendon are normal appearance.  The iliotibial band is normal appearance.  There is no soft tissue mass.      Impression:   1. Findings of quadriceps tendon repair.  Mild thickening but no disruption of fibers. 2. Patellar tendinosis. 3. Mild medial compartment joint space narrowing. 4. Mild chondromalacia patellofemoral compartment.     GODFREY MCDANIELS MD       Electronically Signed and Approved By: GODFREY MCDANIELS MD on 12/14/2023 at 16:33               Diagnoses and all orders for this visit:    1. S/P right quadriceps tendon repair performed on 3/26/2023 (Primary)    2. Chondromalacia of right knee        We reviewed her MRI.  Her quadriceps tendon appears fully healed.  She does have chondromalacia throughout the  knee.  No meniscal tear identified.  There is also patellar tendinopathy.  We discussed treatment options.  We discussed various injections.  She is interested in PRP.  We will seek approval and she will return for a PRP injection in the right knee.      Call or return if worsening symptoms.    Scribed for Jaxon Aguillon MD by Maria Luisa Caraballo  12/22/2023   10:26 EST         Follow Up       PRP right knee    Patient was given instructions and counseling regarding her condition or for health maintenance advice. Please see specific information pulled into the AVS if appropriate.       I have personally performed the services described in this document as scribed by the above individual and it is both accurate and complete.     Jaxon Aguillon MD  12/22/23  12:09 EST

## 2024-01-08 ENCOUNTER — TELEPHONE (OUTPATIENT)
Dept: ORTHOPEDIC SURGERY | Facility: CLINIC | Age: 44
End: 2024-01-08
Payer: OTHER GOVERNMENT

## 2024-01-08 NOTE — TELEPHONE ENCOUNTER
[facility] received a fax that requires your attention. The document has been indexed to the patient’s chart for your review.      Reason for sending: [employer]ASMA AUTH    Documents Description: [employer] AUTH-DR CLOUD-[id number]    Name of Sender: SUSAN    Date Indexed: 1.8.24    Notes (if needed):

## 2024-01-12 ENCOUNTER — TELEPHONE (OUTPATIENT)
Dept: ORTHOPEDIC SURGERY | Facility: CLINIC | Age: 44
End: 2024-01-12
Payer: OTHER GOVERNMENT

## 2024-01-12 NOTE — TELEPHONE ENCOUNTER
APPT:  1-31 AT 2:30 PM RT KNEE PRP INJ WITH DR CLOUD/SHARAN.  PT REQ AFTERNOON APPT.    PER PT, NO RECENT INJ.     ENTERED AUTH #

## 2024-01-12 NOTE — TELEPHONE ENCOUNTER
----- Message from Maria Luisa Caraballo sent at 1/9/2024  9:54 AM EST -----  Regarding: PRP  PLEASE SCHEDULE PATIENT FOR A RIGHT KNEE PRP INJECTION WITH DR CLOUD.  SUSAN MEAD SCANNED INTO CHART.

## 2024-01-31 ENCOUNTER — OFFICE VISIT (OUTPATIENT)
Dept: ORTHOPEDIC SURGERY | Facility: CLINIC | Age: 44
End: 2024-01-31
Payer: OTHER GOVERNMENT

## 2024-01-31 VITALS — BODY MASS INDEX: 26.46 KG/M2 | HEART RATE: 64 BPM | OXYGEN SATURATION: 98 % | HEIGHT: 64 IN | WEIGHT: 155 LBS

## 2024-01-31 DIAGNOSIS — Z47.89 ORTHOPEDIC AFTERCARE: Primary | ICD-10-CM

## 2024-01-31 DIAGNOSIS — M94.261 CHONDROMALACIA OF RIGHT KNEE: ICD-10-CM

## 2024-01-31 NOTE — PROGRESS NOTES
"Chief Complaint  Follow-up and Pain of the Right Knee    Subjective          Breanna Corona presents to [facility] for   History of Present Illness    The patient presents here today for follow up evaluation of the right knee. The patient has been treating her right knee osteoarthritis conservatively. She is here today for a PRP injection today. To review, She had a quadriceps tendon repair performed on 3/26/2023.     No Known Allergies     Social History     Socioeconomic History    Marital status:    Tobacco Use    Smoking status: Never     Passive exposure: Never    Smokeless tobacco: Never   Vaping Use    Vaping Use: Never used   Substance and Sexual Activity    Alcohol use: Not Currently     Alcohol/week: 2.0 standard drinks of alcohol     Types: 2 Glasses of wine per week    Drug use: Defer    Sexual activity: Defer        I reviewed the patient's chief complaint, history of present illness, review of systems, past medical history, surgical history, family history, social history, medications, and allergy list.     REVIEW OF SYSTEMS    Constitutional: Denies fevers, chills, weight loss  Cardiovascular: Denies chest pain, shortness of breath  Skin: Denies rashes, acute skin changes  Neurologic: Denies headache, loss of consciousness  MSK: right knee pain      Objective   Vital Signs:   Pulse 64   Ht 162.6 cm (64\")   Wt 70.3 kg (155 lb)   SpO2 98%   BMI 26.61 kg/m²     Body mass index is 26.61 kg/m².    Physical Exam    General: Alert. No acute distress.   Right lower extremity: full extension. No lag. 5/5 knee extension. Flexion 120 degrees. Stable to varus/valgus stress. Stable to anterior/posterior drawer. Negative Lachman's. Medial tenderness. Intact ankle plantar flexion and dorsiflexion. Positive EHL, FHL, GS and TA. Sensation intact to all 5 nerves of the foot. Positive pulses. Well healed scar.      Prp right knee: R knee  Date/Time: 1/31/2024 3:05 PM  Consent " given by: patient  Site marked: site marked  Timeout: Immediately prior to procedure a time out was called to verify the correct patient, procedure, equipment, support staff and site/side marked as required   Supporting Documentation  Indications: pain   Procedure Details  Location: knee - R knee  Needle gauge: 21g.  Patient tolerance: patient tolerated the procedure well with no immediate complications      Imaging Results (Most Recent)       None                     Assessment and Plan        No results found.     Diagnoses and all orders for this visit:    1. S/P right quadriceps tendon repair performed on 3/26/2023 (Primary)    2. Chondromalacia of right knee        Discussed the treatment plan with the patient.  Discussed the risks and benefits of a right knee PRP injection. The patient expressed understanding and wished to proceed. She tolerated the procedure well. Plan to continue exercises and medication.     Call or return if worsening symptoms.    Scribed for Jaxon Aguillon MD by Nae Espitia  01/31/2024   14:34 EST         Follow Up       3 months    Patient was given instructions and counseling regarding her condition or for health maintenance advice. Please see specific information pulled into the AVS if appropriate.       I have personally performed the services described in this document as scribed by the above individual and it is both accurate and complete.     Jaxon Aguillon MD  01/31/24  14:36 EST

## 2024-08-22 ENCOUNTER — OFFICE VISIT (OUTPATIENT)
Dept: FAMILY MEDICINE CLINIC | Facility: CLINIC | Age: 44
End: 2024-08-22
Payer: OTHER GOVERNMENT

## 2024-08-22 ENCOUNTER — LAB (OUTPATIENT)
Dept: LAB | Facility: HOSPITAL | Age: 44
End: 2024-08-22
Payer: OTHER GOVERNMENT

## 2024-08-22 VITALS
HEIGHT: 64 IN | BODY MASS INDEX: 29.19 KG/M2 | SYSTOLIC BLOOD PRESSURE: 145 MMHG | WEIGHT: 171 LBS | HEART RATE: 55 BPM | DIASTOLIC BLOOD PRESSURE: 65 MMHG | OXYGEN SATURATION: 100 %

## 2024-08-22 DIAGNOSIS — Z13.220 LIPID SCREENING: ICD-10-CM

## 2024-08-22 DIAGNOSIS — N80.03 ADENOMYOSIS OF THE UTERUS: ICD-10-CM

## 2024-08-22 DIAGNOSIS — K21.9 GASTROESOPHAGEAL REFLUX DISEASE, UNSPECIFIED WHETHER ESOPHAGITIS PRESENT: Primary | ICD-10-CM

## 2024-08-22 DIAGNOSIS — R63.5 WEIGHT GAIN: ICD-10-CM

## 2024-08-22 DIAGNOSIS — D72.829 LEUKOCYTOSIS, UNSPECIFIED TYPE: ICD-10-CM

## 2024-08-22 DIAGNOSIS — R03.0 WHITE COAT SYNDROME WITHOUT DIAGNOSIS OF HYPERTENSION: ICD-10-CM

## 2024-08-22 DIAGNOSIS — R73.09 ELEVATED GLUCOSE: ICD-10-CM

## 2024-08-22 DIAGNOSIS — Z12.31 BREAST CANCER SCREENING BY MAMMOGRAM: ICD-10-CM

## 2024-08-22 DIAGNOSIS — R22.1 NECK FULLNESS: ICD-10-CM

## 2024-08-22 DIAGNOSIS — Z12.11 COLON CANCER SCREENING: ICD-10-CM

## 2024-08-22 DIAGNOSIS — R13.10 DYSPHAGIA, UNSPECIFIED TYPE: ICD-10-CM

## 2024-08-22 LAB
ALBUMIN SERPL-MCNC: 4.7 G/DL (ref 3.5–5.2)
ALBUMIN/GLOB SERPL: 1.8 G/DL
ALP SERPL-CCNC: 52 U/L (ref 39–117)
ALT SERPL W P-5'-P-CCNC: 24 U/L (ref 1–33)
ANION GAP SERPL CALCULATED.3IONS-SCNC: 12 MMOL/L (ref 5–15)
AST SERPL-CCNC: 25 U/L (ref 1–32)
BASOPHILS # BLD AUTO: 0.1 10*3/MM3 (ref 0–0.2)
BASOPHILS NFR BLD AUTO: 1.5 % (ref 0–1.5)
BILIRUB SERPL-MCNC: 0.5 MG/DL (ref 0–1.2)
BUN SERPL-MCNC: 12 MG/DL (ref 6–20)
BUN/CREAT SERPL: 14 (ref 7–25)
CALCIUM SPEC-SCNC: 9.5 MG/DL (ref 8.6–10.5)
CHLORIDE SERPL-SCNC: 101 MMOL/L (ref 98–107)
CHOLEST SERPL-MCNC: 177 MG/DL (ref 0–200)
CO2 SERPL-SCNC: 24 MMOL/L (ref 22–29)
CREAT SERPL-MCNC: 0.86 MG/DL (ref 0.57–1)
DEPRECATED RDW RBC AUTO: 42.3 FL (ref 37–54)
EGFRCR SERPLBLD CKD-EPI 2021: 85 ML/MIN/1.73
EOSINOPHIL # BLD AUTO: 0.1 10*3/MM3 (ref 0–0.4)
EOSINOPHIL NFR BLD AUTO: 1.5 % (ref 0.3–6.2)
ERYTHROCYTE [DISTWIDTH] IN BLOOD BY AUTOMATED COUNT: 11.8 % (ref 12.3–15.4)
GLOBULIN UR ELPH-MCNC: 2.6 GM/DL
GLUCOSE SERPL-MCNC: 88 MG/DL (ref 65–99)
HBA1C MFR BLD: 5.3 % (ref 4.8–5.6)
HCT VFR BLD AUTO: 44 % (ref 34–46.6)
HDLC SERPL-MCNC: 78 MG/DL (ref 40–60)
HGB BLD-MCNC: 15.1 G/DL (ref 12–15.9)
IMM GRANULOCYTES # BLD AUTO: 0.04 10*3/MM3 (ref 0–0.05)
IMM GRANULOCYTES NFR BLD AUTO: 0.6 % (ref 0–0.5)
LDLC SERPL CALC-MCNC: 90 MG/DL (ref 0–100)
LDLC/HDLC SERPL: 1.15 {RATIO}
LYMPHOCYTES # BLD AUTO: 2.13 10*3/MM3 (ref 0.7–3.1)
LYMPHOCYTES NFR BLD AUTO: 32 % (ref 19.6–45.3)
MCH RBC QN AUTO: 33.5 PG (ref 26.6–33)
MCHC RBC AUTO-ENTMCNC: 34.3 G/DL (ref 31.5–35.7)
MCV RBC AUTO: 97.6 FL (ref 79–97)
MONOCYTES # BLD AUTO: 0.52 10*3/MM3 (ref 0.1–0.9)
MONOCYTES NFR BLD AUTO: 7.8 % (ref 5–12)
NEUTROPHILS NFR BLD AUTO: 3.76 10*3/MM3 (ref 1.7–7)
NEUTROPHILS NFR BLD AUTO: 56.6 % (ref 42.7–76)
NRBC BLD AUTO-RTO: 0 /100 WBC (ref 0–0.2)
PLATELET # BLD AUTO: 334 10*3/MM3 (ref 140–450)
PMV BLD AUTO: 10.2 FL (ref 6–12)
POTASSIUM SERPL-SCNC: 3.9 MMOL/L (ref 3.5–5.2)
PROT SERPL-MCNC: 7.3 G/DL (ref 6–8.5)
RBC # BLD AUTO: 4.51 10*6/MM3 (ref 3.77–5.28)
SODIUM SERPL-SCNC: 137 MMOL/L (ref 136–145)
T4 FREE SERPL-MCNC: 1.33 NG/DL (ref 0.92–1.68)
TRIGL SERPL-MCNC: 46 MG/DL (ref 0–150)
TSH SERPL DL<=0.05 MIU/L-ACNC: 1.47 UIU/ML (ref 0.27–4.2)
VLDLC SERPL-MCNC: 9 MG/DL (ref 5–40)
WBC NRBC COR # BLD AUTO: 6.65 10*3/MM3 (ref 3.4–10.8)

## 2024-08-22 PROCEDURE — 84439 ASSAY OF FREE THYROXINE: CPT

## 2024-08-22 PROCEDURE — 85025 COMPLETE CBC W/AUTO DIFF WBC: CPT

## 2024-08-22 PROCEDURE — 83036 HEMOGLOBIN GLYCOSYLATED A1C: CPT

## 2024-08-22 PROCEDURE — 84443 ASSAY THYROID STIM HORMONE: CPT

## 2024-08-22 PROCEDURE — 36415 COLL VENOUS BLD VENIPUNCTURE: CPT

## 2024-08-22 PROCEDURE — 80061 LIPID PANEL: CPT

## 2024-08-22 PROCEDURE — 80053 COMPREHEN METABOLIC PANEL: CPT

## 2024-08-22 RX ORDER — OMEPRAZOLE 20 MG/1
20 CAPSULE, DELAYED RELEASE ORAL DAILY
Qty: 30 CAPSULE | Refills: 0 | Status: SHIPPED | OUTPATIENT
Start: 2024-08-22

## 2024-08-22 RX ORDER — CHLORAL HYDRATE 500 MG
CAPSULE ORAL
COMMUNITY

## 2024-08-22 RX ORDER — CETIRIZINE HYDROCHLORIDE 10 MG/1
10 TABLET ORAL DAILY
COMMUNITY

## 2024-08-22 RX ORDER — ERGOCALCIFEROL (VITAMIN D2) 10 MCG
400 TABLET ORAL DAILY
COMMUNITY

## 2024-08-22 RX ORDER — MULTIPLE VITAMINS W/ MINERALS TAB 9MG-400MCG
1 TAB ORAL DAILY
COMMUNITY

## 2024-08-22 NOTE — PROGRESS NOTES
Chief Complaint  Establish Care and Difficulty Swallowing    SUBJECTIVE  Breanna Corona presents to [facility] to establish care. Pt recently moved here from [place]. Pt states she is having difficulty swallowing.  States when she swallows and after she swallows she feels like it is stuck, has actually had food get stuck a few times and had to eventually vomit.  Patient does admit to off-and-on issues with reflux and it has been more frequent recently.  Does not take any medication for this.  Onset symptoms 1 month, but occasionally for the last 3-4 months if her head is turned to the side and she swallows she can feel a click sensation    Patient also reports that she has a constant feeling of fullness or pressure in her throat.    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 No pertinent past medical history.   Family History   Problem Relation Age of Onset    Cancer Father         No      Past Surgical History:   Procedure Laterality Date    QUADRICEPS TENDON REPAIR Right 3/26/2023    Procedure: QUADRICEPS TENDON REPAIR;  Surgeon: Jaxon Aguillon MD;  Location: [facility];  Service: Orthopedics;  Laterality: Right;        Current Outpatient Medications:     cetirizine (zyrTEC) 10 MG tablet, Take 1 tablet by mouth Daily., Disp: , Rfl:     Magnesium Hydroxide (MAGNESIA PO), Take  by mouth., Disp: , Rfl:     multivitamin with minerals tablet tablet, Take 1 tablet by mouth Daily., Disp: , Rfl:     Omega-3 Fatty Acids (fish oil) 1000 MG capsule capsule, Take  by mouth Daily With Breakfast., Disp: , Rfl:     Vitamin D,  "Cholecalciferol, (CHOLECALCIFEROL) 10 MCG (400 UNIT) tablet, Take 1 tablet by mouth Daily., Disp: , Rfl:     omeprazole (priLOSEC) 20 MG capsule, Take 1 capsule by mouth Daily., Disp: 30 capsule, Rfl: 0    OBJECTIVE  Vital Signs:   /65   Pulse 55   Ht 162.6 cm (64\")   Wt 77.6 kg (171 lb)   SpO2 100%   BMI 29.35 kg/m²    Estimated body mass index is 29.35 kg/m² as calculated from the following:    Height as of this encounter: 162.6 cm (64\").    Weight as of this encounter: 77.6 kg (171 lb).     Wt Readings from Last 3 Encounters:   08/22/24 77.6 kg (171 lb)   01/31/24 70.3 kg (155 lb)   12/22/23 70.3 kg (155 lb)     BP Readings from Last 3 Encounters:   08/22/24 145/65   12/22/23 118/77   12/04/23 147/85       Physical Exam  Vitals reviewed.   Constitutional:       Appearance: Normal appearance. She is well-developed.   HENT:      Head: Normocephalic and atraumatic.      Right Ear: External ear normal.      Left Ear: External ear normal.      Mouth/Throat:      Pharynx: No oropharyngeal exudate or posterior oropharyngeal erythema.   Eyes:      Conjunctiva/sclera: Conjunctivae normal.      Pupils: Pupils are equal, round, and reactive to light.   Neck:      Thyroid: No thyroid mass or thyromegaly.   Cardiovascular:      Rate and Rhythm: Normal rate and regular rhythm.      Heart sounds: No murmur heard.     No friction rub. No gallop.   Pulmonary:      Effort: Pulmonary effort is normal.      Breath sounds: Normal breath sounds. No wheezing or rhonchi.   Musculoskeletal:      Cervical back: No tenderness.   Lymphadenopathy:      Cervical: No cervical adenopathy.   Skin:     General: Skin is warm and dry.   Neurological:      Mental Status: She is alert and oriented to person, place, and time.      Cranial Nerves: No cranial nerve deficit.   Psychiatric:         Mood and Affect: Mood and affect normal.         Behavior: Behavior normal.         Thought Content: Thought content normal.         Judgment: " Judgment normal.          Result Review          No Images in the past 120 days found..     The above data has been reviewed by MONA Moser 08/22/2024 13:03 EDT.          Patient Care Team:  Mar Cunha APRN as PCP - General (Nurse Practitioner)    BMI is >= 25 and <30. (Overweight) The following options were offered after discussion;: exercise counseling/recommendations and nutrition counseling/recommendations       ASSESSMENT & PLAN    Diagnoses and all orders for this visit:    1. Gastroesophageal reflux disease, unspecified whether esophagitis present (Primary)  Assessment & Plan:  Patient with history of reflux off and on, road worsening recently, now having difficulty with swallowing, we will trial omeprazole, and are obtaining further evaluation    Orders:  -     omeprazole (priLOSEC) 20 MG capsule; Take 1 capsule by mouth Daily.  Dispense: 30 capsule; Refill: 0    2. Dysphagia, unspecified type  -     FL Video Swallow With Speech Single Contrast; Future    3. Neck fullness  -     TSH+Free T4; Future  -     US Thyroid; Future    4. Weight gain  -     TSH+Free T4; Future    5. Adenomyosis of the uterus  -     Ambulatory Referral to Obstetrics / Gynecology    6. Breast cancer screening by mammogram  -     Mammo Screening Digital Tomosynthesis Bilateral With CAD; Future    7. White coat syndrome without diagnosis of hypertension  Assessment & Plan:  Blood pressure elevated, improved upon manual recheck but still mildly elevated at 134/72, patient reports history of whitecoat hypertension, reports that she monitors her blood pressure very regularly and is typically 120s over 70s.      8. Leukocytosis, unspecified type  -     CBC & Differential; Future    9. Elevated glucose  -     Comprehensive Metabolic Panel; Future  -     Hemoglobin A1c; Future    10. Lipid screening  -     Lipid Panel; Future    11. Colon cancer screening  -     Cologuard - Stool, Per Rectum; Future         Tobacco Use:  Low Risk  (8/22/2024)    Patient History     Smoking Tobacco Use: Never     Smokeless Tobacco Use: Never     Passive Exposure: Never       Follow Up     Return if symptoms worsen or fail to improve.        Patient was given instructions and counseling regarding her condition or for health maintenance advice. Please see specific information pulled into the AVS if appropriate.   I have reviewed information obtained and documented by others and I have confirmed the accuracy of this documented note.    MONA Moser

## 2024-08-22 NOTE — ASSESSMENT & PLAN NOTE
Patient with history of reflux off and on, road worsening recently, now having difficulty with swallowing, we will trial omeprazole, and are obtaining further evaluation

## 2024-08-22 NOTE — ASSESSMENT & PLAN NOTE
Blood pressure elevated, improved upon manual recheck but still mildly elevated at 134/72, patient reports history of whitecoat hypertension, reports that she monitors her blood pressure very regularly and is typically 120s over 70s.

## 2024-08-23 ENCOUNTER — LAB (OUTPATIENT)
Dept: LAB | Facility: HOSPITAL | Age: 44
End: 2024-08-23
Payer: OTHER GOVERNMENT

## 2024-08-23 ENCOUNTER — TELEPHONE (OUTPATIENT)
Dept: FAMILY MEDICINE CLINIC | Facility: CLINIC | Age: 44
End: 2024-08-23
Payer: OTHER GOVERNMENT

## 2024-08-23 DIAGNOSIS — R79.89 ABNORMAL CBC: Primary | ICD-10-CM

## 2024-08-23 DIAGNOSIS — R79.89 ABNORMAL CBC: ICD-10-CM

## 2024-08-23 DIAGNOSIS — Z12.11 COLON CANCER SCREENING: Primary | ICD-10-CM

## 2024-08-23 LAB
FOLATE SERPL-MCNC: 14.3 NG/ML (ref 4.78–24.2)
VIT B12 BLD-MCNC: 764 PG/ML (ref 211–946)

## 2024-08-23 PROCEDURE — 82746 ASSAY OF FOLIC ACID SERUM: CPT

## 2024-08-23 PROCEDURE — 36415 COLL VENOUS BLD VENIPUNCTURE: CPT

## 2024-08-23 PROCEDURE — 82607 VITAMIN B-12: CPT

## 2024-08-23 NOTE — TELEPHONE ENCOUNTER
Patient is wanting to switch and get a colonoscopy done. She is also requesting a call back about getting that done with some other lab work.

## 2024-08-26 DIAGNOSIS — R79.89 ABNORMAL CBC: Primary | ICD-10-CM

## 2024-09-12 ENCOUNTER — HOSPITAL ENCOUNTER (OUTPATIENT)
Dept: ULTRASOUND IMAGING | Facility: HOSPITAL | Age: 44
Discharge: HOME | End: 2024-09-12
Admitting: NURSE PRACTITIONER
Payer: OTHER GOVERNMENT

## 2024-09-12 DIAGNOSIS — R22.1 NECK FULLNESS: ICD-10-CM

## 2024-09-12 PROCEDURE — 76536 US EXAM OF HEAD AND NECK: CPT

## 2024-10-02 ENCOUNTER — HOSPITAL ENCOUNTER (OUTPATIENT)
Dept: GENERAL RADIOLOGY | Facility: HOSPITAL | Age: 44
Discharge: HOME | End: 2024-10-02
Payer: OTHER GOVERNMENT

## 2024-10-02 ENCOUNTER — HOSPITAL ENCOUNTER (OUTPATIENT)
Dept: MAMMOGRAPHY | Facility: HOSPITAL | Age: 44
Discharge: HOME | End: 2024-10-02
Payer: OTHER GOVERNMENT

## 2024-10-02 DIAGNOSIS — R13.10 DYSPHAGIA, UNSPECIFIED TYPE: ICD-10-CM

## 2024-10-02 DIAGNOSIS — Z12.31 BREAST CANCER SCREENING BY MAMMOGRAM: ICD-10-CM

## 2024-10-02 PROCEDURE — 92611 MOTION FLUOROSCOPY/SWALLOW: CPT

## 2024-10-02 PROCEDURE — 77063 BREAST TOMOSYNTHESIS BI: CPT

## 2024-10-02 PROCEDURE — 74230 X-RAY XM SWLNG FUNCJ C+: CPT

## 2024-10-02 PROCEDURE — 77067 SCR MAMMO BI INCL CAD: CPT

## 2024-10-02 RX ADMIN — BARIUM SULFATE 25 ML: 400 PASTE ORAL at 08:57

## 2024-10-02 RX ADMIN — BARIUM SULFATE 55 ML: 0.81 POWDER, FOR SUSPENSION ORAL at 08:57

## 2024-10-02 RX ADMIN — BARIUM SULFATE 50 ML: 400 SUSPENSION ORAL at 08:57

## 2024-10-02 NOTE — MBS/VFSS/FEES
Outpatient  - Speech Language Pathology   Swallow  Modified Barium Swallow Study   Toni     Patient Name: Breanna Corona  : 1980  MRN: [id number]  Today's Date: 10/2/2024               Admit Date: 10/2/2024    Visit Dx:     ICD-10-CM ICD-9-CM   1. Dysphagia, unspecified type  R13.10 787.20     Patient Active Problem List   Diagnosis    Quadriceps tendon rupture, right, initial encounter    S/P right quadriceps tendon repair performed on 3/26/2023    Gastroesophageal reflux disease    White coat syndrome without diagnosis of hypertension     No past medical history on file.  Past Surgical History:   Procedure Laterality Date    QUADRICEPS TENDON REPAIR Right 3/26/2023    Procedure: QUADRICEPS TENDON REPAIR;  Surgeon: Jaxon Aguillon MD;  Location: [facility];  Service: Orthopedics;  Laterality: Right;       MODIFIED BARIUM SWALLOW STUDY: SPEECH PATHOLOGY REPORT        DATE OF SERVICE: 10/2/2024    PERTINENT INFORMATION:  Ms. Corona is a 44year old female with diagnosis of dysphagia.    She was referred for an MBSS by Dr. Cunha to rule out aspiration as well as to determine appropriate treatment plan for this patient.      PROCEDURE:    Ms. Corona was alert and cooperative.  The patient was viewed in the lateral plane.  The following Ba consistencies were administered: Thin liquids, nectar thick liquids, barium mixed with applesauce, barium paste, barium mixed with cracker. The following compensatory swallowing strategies were performed: Bolus modification, cyclic ingestion.    RESULTS:    1. Nectar liquid by spoon with spillage to the pharynx, swallow completed with transient laryngeal penetration.  2. Nectar liquid by cup, swallow completed with transient laryngeal penetration.  3. Thin liquid by cup, spillage to the pharynx, swallow completed with minimal transient laryngeal penetration.  4. Purée with swallow completed.  5. Pudding with swallow completed.  6. Solid results with chewing  followed by swallow completed.  7. Thin liquid via straw with large drink, swallow completed.  Throughout study irregular bolus flow was noted through the cricopharyngeal area with osteophyte like structure at approximate level of C6-C7, this did not impede the bolus.      IMPRESSIONS:    Ms. Alves demonstrated mild oropharyngeal dysphagia characterized by intermittent swallow delay.  Transient laryngeal penetration noted at times with liquids.  No aspiration was observed during the study.  Please see radiologist report.    FUNCTIONAL DEFICIT: Patient scored level 7 of 7 on Functional Communication Measures for swallowing indicating a 0% limitation in function for current status, goal status, and discharge status.      RECOMMENDATIONS:   1.  Diet of regular solids cut small, thin liquid.  2.  Positioning fully upright for all p.o. intake and 30 minutes following.  3.  Alternate small bites and small sips of solids and liquids at a slow rate.      Yes, patient/responsible party agrees with the plan of care and has been informed of all alternatives, risks and benefits.    Thank you for this referral.                                                                                            EDUCATION  The patient has been educated in the following areas:   Modified Diet Instruction.                Time Calculation:    Time Calculation- SLP       Row Name 10/02/24 1031             Time Calculation- SLP    SLP Received On 10/02/24  -TB         Untimed Charges    SLP Eval/Re-eval  ST Motion Fluoro Eval Swallow - 41197  -TB      54812-NR Motion Fluoro Eval Swallow Minutes 90  -TB         Total Minutes    Untimed Charges Total Minutes 90  -TB       Total Minutes 90  -TB                User Key  (r) = Recorded By, (t) = Taken By, (c) = Cosigned By      Initials Name Provider Type    Cady Perez SLP Speech and Language Pathologist                    Therapy Charges for Today       Code Description Service Date Service  Provider Modifiers Qty    [id number] HC ST MOTION FLUORO EVAL SWALLOW 6 10/2/2024 Cady Izquierdo, SLP GN 1                 CHAPO Jett  10/2/2024

## 2024-10-16 ENCOUNTER — OFFICE VISIT (OUTPATIENT)
Dept: FAMILY MEDICINE CLINIC | Facility: CLINIC | Age: 44
End: 2024-10-16
Payer: OTHER GOVERNMENT

## 2024-10-16 ENCOUNTER — LAB (OUTPATIENT)
Dept: LAB | Facility: HOSPITAL | Age: 44
End: 2024-10-16
Payer: OTHER GOVERNMENT

## 2024-10-16 VITALS
OXYGEN SATURATION: 98 % | WEIGHT: 172 LBS | SYSTOLIC BLOOD PRESSURE: 126 MMHG | HEART RATE: 52 BPM | HEIGHT: 64 IN | BODY MASS INDEX: 29.37 KG/M2 | DIASTOLIC BLOOD PRESSURE: 62 MMHG

## 2024-10-16 DIAGNOSIS — R76.8 POSITIVE ANA (ANTINUCLEAR ANTIBODY): ICD-10-CM

## 2024-10-16 DIAGNOSIS — Z00.00 ANNUAL PHYSICAL EXAM: Primary | ICD-10-CM

## 2024-10-16 DIAGNOSIS — M25.50 ARTHRALGIA, UNSPECIFIED JOINT: ICD-10-CM

## 2024-10-16 DIAGNOSIS — R13.10 DYSPHAGIA, UNSPECIFIED TYPE: ICD-10-CM

## 2024-10-16 LAB
CHROMATIN AB SERPL-ACNC: 10 IU/ML (ref 0–14)
CK SERPL-CCNC: 295 U/L (ref 20–180)
CRP SERPL-MCNC: <0.3 MG/DL (ref 0–0.5)
ERYTHROCYTE [SEDIMENTATION RATE] IN BLOOD: 2 MM/HR (ref 0–20)

## 2024-10-16 PROCEDURE — 86431 RHEUMATOID FACTOR QUANT: CPT

## 2024-10-16 PROCEDURE — 36415 COLL VENOUS BLD VENIPUNCTURE: CPT

## 2024-10-16 PROCEDURE — 99396 PREV VISIT EST AGE 40-64: CPT | Performed by: NURSE PRACTITIONER

## 2024-10-16 PROCEDURE — 86200 CCP ANTIBODY: CPT

## 2024-10-16 PROCEDURE — 86038 ANTINUCLEAR ANTIBODIES: CPT

## 2024-10-16 PROCEDURE — 82550 ASSAY OF CK (CPK): CPT

## 2024-10-16 PROCEDURE — 85652 RBC SED RATE AUTOMATED: CPT

## 2024-10-16 PROCEDURE — 86140 C-REACTIVE PROTEIN: CPT

## 2024-10-16 NOTE — PROGRESS NOTES
"Chief Complaint  Annual Exam    SUBJECTIVE  Breanna Corona presents to [facility] for annual exam and follow up on swallow study.  Patient states she is still having difficulty swallowing.  States that is very transient, comes and goes.    Patient reports that she was seen at a wellness clinic recently and had labs done which showed a positive JENNIFER and an elevated speckled pattern 1:320     Patient states she does not have any known family history of autoimmune conditions, however she does have a lot of arthritis pain, particularly if she does not stay extremely active    History of Present Illness  History reviewed. No pertinent past medical history.   Family History   Problem Relation Age of Onset    Cancer Father         No      Past Surgical History:   Procedure Laterality Date    QUADRICEPS TENDON REPAIR Right 3/26/2023    Procedure: QUADRICEPS TENDON REPAIR;  Surgeon: Jaxon Aguillon MD;  Location: [facility] OR;  Service: Orthopedics;  Laterality: Right;        Current Outpatient Medications:     cetirizine (zyrTEC) 10 MG tablet, Take 1 tablet by mouth Daily., Disp: , Rfl:     Magnesium Hydroxide (MAGNESIA PO), Take  by mouth., Disp: , Rfl:     Misc Natural Products (CORTISOL PO), Take  by mouth. Cortisol Support, Disp: , Rfl:     multivitamin with minerals tablet tablet, Take 1 tablet by mouth Daily., Disp: , Rfl:     Omega-3 Fatty Acids (fish oil) 1000 MG capsule capsule, Take  by mouth Daily With Breakfast., Disp: , Rfl:     omeprazole (priLOSEC) 20 MG capsule, Take 1 capsule by mouth Daily., Disp: 30 capsule, Rfl: 0    THYROID PO, Take  by mouth. Thyroid Support, Disp: , Rfl:     Vitamin D, Cholecalciferol, (CHOLECALCIFEROL) 10 MCG (400 UNIT) tablet, Take 1 tablet by mouth Daily., Disp: , Rfl:     OBJECTIVE  Vital Signs:   /62   Pulse 52   Ht 162.6 cm (64\")   Wt 78 kg (172 lb)   SpO2 98%   BMI 29.52 kg/m²    Estimated body mass index is 29.52 kg/m² as " "calculated from the following:    Height as of this encounter: 162.6 cm (64\").    Weight as of this encounter: 78 kg (172 lb).     Wt Readings from Last 3 Encounters:   10/16/24 78 kg (172 lb)   08/22/24 77.6 kg (171 lb)   01/31/24 70.3 kg (155 lb)     BP Readings from Last 3 Encounters:   10/16/24 126/62   08/22/24 145/65   12/22/23 118/77       Physical Exam  Vitals reviewed.   Constitutional:       General: She is not in acute distress.     Appearance: Normal appearance. She is well-developed. She is not diaphoretic.   HENT:      Head: Normocephalic and atraumatic. Hair is normal.      Right Ear: Hearing, tympanic membrane, ear canal and external ear normal. No decreased hearing noted. No drainage.      Left Ear: Hearing, tympanic membrane, ear canal and external ear normal. No decreased hearing noted.      Nose: Nose normal. No nasal deformity.      Mouth/Throat:      Mouth: Mucous membranes are moist.   Eyes:      General: Lids are normal.         Right eye: No discharge.         Left eye: No discharge.      Extraocular Movements: Extraocular movements intact.      Conjunctiva/sclera: Conjunctivae normal.      Pupils: Pupils are equal, round, and reactive to light.   Neck:      Thyroid: No thyromegaly.      Vascular: No JVD.   Cardiovascular:      Rate and Rhythm: Normal rate and regular rhythm.      Pulses: Normal pulses.      Heart sounds: Normal heart sounds. No murmur heard.     No friction rub. No gallop.   Pulmonary:      Effort: Pulmonary effort is normal. No respiratory distress.      Breath sounds: Normal breath sounds. No wheezing or rales.   Chest:      Chest wall: No tenderness.   Abdominal:      General: Bowel sounds are normal. There is no distension.      Palpations: Abdomen is soft. There is no mass.      Tenderness: There is no abdominal tenderness. There is no guarding or rebound.      Hernia: No hernia is present.   Musculoskeletal:         General: No tenderness or deformity. Normal range of " motion.      Cervical back: Normal range of motion and neck supple.   Lymphadenopathy:      Cervical: No cervical adenopathy.   Skin:     General: Skin is warm and dry.      Findings: No erythema or rash.   Neurological:      Mental Status: She is alert and oriented to person, place, and time.      Cranial Nerves: No cranial nerve deficit.      Motor: No abnormal muscle tone.      Coordination: Coordination normal.      Deep Tendon Reflexes: Reflexes are normal and symmetric. Reflexes normal.   Psychiatric:         Mood and Affect: Mood normal.         Behavior: Behavior normal.         Thought Content: Thought content normal.         Judgment: Judgment normal.          Result Review    CMP          8/22/2024    13:56   CMP   Glucose 88    BUN 12    Creatinine 0.86    EGFR 85.0    Sodium 137    Potassium 3.9    Chloride 101    Calcium 9.5    Total Protein 7.3    Albumin 4.7    Globulin 2.6    Total Bilirubin 0.5    Alkaline Phosphatase 52    AST (SGOT) 25    ALT (SGPT) 24    Albumin/Globulin Ratio 1.8    BUN/Creatinine Ratio 14.0    Anion Gap 12.0      CBC          8/22/2024    13:56   CBC   WBC 6.65    RBC 4.51    Hemoglobin 15.1    Hematocrit 44.0    MCV 97.6    MCH 33.5    MCHC 34.3    RDW 11.8    Platelets 334      CBC w/diff          8/22/2024    13:56   CBC w/Diff   WBC 6.65    RBC 4.51    Hemoglobin 15.1    Hematocrit 44.0    MCV 97.6    MCH 33.5    MCHC 34.3    RDW 11.8    Platelets 334    Neutrophil Rel % 56.6    Immature Granulocyte Rel % 0.6    Lymphocyte Rel % 32.0    Monocyte Rel % 7.8    Eosinophil Rel % 1.5    Basophil Rel % 1.5      Lipid Panel          8/22/2024    13:56   Lipid Panel   Total Cholesterol 177    Triglycerides 46    HDL Cholesterol 78    VLDL Cholesterol 9    LDL Cholesterol  90    LDL/HDL Ratio 1.15      TSH          8/22/2024    13:56   TSH   TSH 1.470      Most Recent A1C          8/22/2024    13:56   HGBA1C Most Recent   Hemoglobin A1C 5.30        A1C Last 3 Results           8/22/2024    13:56   HGBA1C Last 3 Results   Hemoglobin A1C 5.30            Lab Results   Component Value Date    FREET4 1.33 08/22/2024     Lab Results   Component Value Date    NRRPJSQN80 764 08/23/2024       Mammo Screening Digital Tomosynthesis Bilateral With CAD    Result Date: 10/15/2024  No mammographic evidence of malignancy.  Recommend annual screening mammography.  BI-RADS ASSESSMENT: Category 1: Negative  Note: It has been reported that there is approximately a 15% false negative rate in mammography.  Therefore, management of a palpable abnormality should not be deferred because of a negative mammogram.  Electronically Signed By-Génesis De Los Santos MD On:10/15/2024 10:26 AM      FL Video Swallow With Speech Single Contrast    Result Date: 10/2/2024  Impression: 1. Transient laryngeal penetration with nectar and thin liquid barium 2. Degenerative changes in the C4-C7 cervical spine Please consult speech pathology report for further details regarding the exam and for dietary recommendations. Report dictated by: Stephany Gimenez  I have personally reviewed this case and agree with the findings above: Electronically Signed: Cipriano Aguilar MD  10/2/2024 11:28 AM EDT  Workstation ID: [id number]    US Thyroid    Result Date: 9/13/2024  Impression: Unremarkable thyroid. Electronically Signed: Cipriano Aguilar MD  9/13/2024 11:52 AM EDT  Workstation ID: [id number]       The above data has been reviewed by MONA Moser 10/16/2024 13:54 EDT.          Patient Care Team:  Mar Cunha APRN as PCP - General (Nurse Practitioner)            ASSESSMENT & PLAN    Diagnoses and all orders for this visit:    1. Annual physical exam (Primary)    2. Dysphagia, unspecified type  -     Ambulatory Referral to General Surgery    3. Positive JENNIFER (antinuclear antibody)  -     JENNIFER Direct Reflex to 11 Biomarker; Future  -     Cyclic citrul peptide antibody, IgG/IgA; Future  -     CK; Future  -     C-reactive protein;  Future  -     Rheumatoid Factor; Future  -     Sedimentation rate; Future    4. Arthralgia, unspecified joint  -     JENNIFER Direct Reflex to 11 Biomarker; Future  -     Cyclic citrul peptide antibody, IgG/IgA; Future  -     CK; Future  -     C-reactive protein; Future  -     Rheumatoid Factor; Future  -     Sedimentation rate; Future         Tobacco Use: Low Risk  (10/16/2024)    Patient History     Smoking Tobacco Use: Never     Smokeless Tobacco Use: Never     Passive Exposure: Never     The patient is advised to attempt to maintain healthy weight, continue current medications, continue current healthy lifestyle patterns, and return for routine annual checkups.        Follow Up     Return if symptoms worsen or fail to improve.        Patient was given instructions and counseling regarding her condition or for health maintenance advice. Please see specific information pulled into the AVS if appropriate.   I have reviewed information obtained and documented by others and I have confirmed the accuracy of this documented note.    MONA Moser

## 2024-10-17 LAB
ANA SER QL: NEGATIVE
CCP IGA+IGG SERPL IA-ACNC: 6 UNITS (ref 0–19)

## 2024-10-29 ENCOUNTER — OFFICE VISIT (OUTPATIENT)
Dept: SURGERY | Facility: CLINIC | Age: 44
End: 2024-10-29
Payer: OTHER GOVERNMENT

## 2024-10-29 ENCOUNTER — PREP FOR SURGERY (OUTPATIENT)
Dept: OTHER | Facility: HOSPITAL | Age: 44
End: 2024-10-29
Payer: OTHER GOVERNMENT

## 2024-10-29 VITALS
HEIGHT: 64 IN | HEART RATE: 52 BPM | SYSTOLIC BLOOD PRESSURE: 127 MMHG | BODY MASS INDEX: 29.88 KG/M2 | WEIGHT: 175 LBS | DIASTOLIC BLOOD PRESSURE: 74 MMHG

## 2024-10-29 DIAGNOSIS — R13.11 ORAL PHASE DYSPHAGIA: Primary | ICD-10-CM

## 2024-10-29 DIAGNOSIS — R10.13 EPIGASTRIC PAIN: ICD-10-CM

## 2024-10-29 DIAGNOSIS — R13.10 DYSPHAGIA, UNSPECIFIED TYPE: Primary | ICD-10-CM

## 2024-10-29 PROCEDURE — 99213 OFFICE O/P EST LOW 20 MIN: CPT | Performed by: NURSE PRACTITIONER

## 2024-10-29 RX ORDER — SODIUM CHLORIDE 0.9 % (FLUSH) 0.9 %
3 SYRINGE (ML) INJECTION EVERY 12 HOURS SCHEDULED
OUTPATIENT
Start: 2024-10-29

## 2024-10-29 RX ORDER — SODIUM CHLORIDE 0.9 % (FLUSH) 0.9 %
10 SYRINGE (ML) INJECTION AS NEEDED
OUTPATIENT
Start: 2024-10-29

## 2024-10-29 RX ORDER — SODIUM CHLORIDE 9 MG/ML
40 INJECTION, SOLUTION INTRAVENOUS AS NEEDED
OUTPATIENT
Start: 2024-12-03 | End: 2024-12-03

## 2024-10-29 NOTE — PROGRESS NOTES
Chief Complaint: EGD    Subjective      EGD consultation       History of Present Illness  Breanna Corona is a 44 y.o. female presents to [facility] for EGD consultation.    Patient presents today on referral from Joya Josue for dysphagia.  Patient reports that over the last several months she has been having trouble swallowing.  She reports intermittent heartburn and indigestion, was recently started on omeprazole.  Patient reports she feels like she has a lump in the back of her throat.  She admits to epigastric pain.  Denies any melena.  Admits to pain after eating.  Denies any family history of esophageal or stomach cancer.    Denies NAM.  Denies any cardiac issues.  Denies taking any GLP-1 receptors.    Objective     Past Medical History:   Diagnosis Date    Anemia 9/10/2024    Macrostatic       Past Surgical History:   Procedure Laterality Date    QUADRICEPS TENDON REPAIR Right 3/26/2023    Procedure: QUADRICEPS TENDON REPAIR;  Surgeon: Jaxon Aguillon MD;  Location: [facility] OR;  Service: Orthopedics;  Laterality: Right;       Outpatient Medications Marked as Taking for the 10/29/24 encounter (Office Visit) with Nelly Connell APRN   Medication Sig Dispense Refill    cetirizine (zyrTEC) 10 MG tablet Take 1 tablet by mouth Daily.      Magnesium Hydroxide (MAGNESIA PO) Take  by mouth.      Misc Natural Products (CORTISOL PO) Take  by mouth. Cortisol Support      multivitamin with minerals tablet tablet Take 1 tablet by mouth Daily.      Omega-3 Fatty Acids (fish oil) 1000 MG capsule capsule Take  by mouth Daily With Breakfast.      omeprazole (priLOSEC) 20 MG capsule Take 1 capsule by mouth Daily. 30 capsule 0    THYROID PO Take  by mouth. Thyroid Support      Vitamin D, Cholecalciferol, (CHOLECALCIFEROL) 10 MCG (400 UNIT) tablet Take 1 tablet by mouth Daily.         No Known Allergies     Family History   Problem Relation Age of Onset    Cancer Father         No  "   Arthritis Father         Hip    Arthritis Mother         Spine       Social History     Socioeconomic History    Marital status:    Tobacco Use    Smoking status: Never     Passive exposure: Never    Smokeless tobacco: Never    Tobacco comments:     No   Vaping Use    Vaping status: Never Used   Substance and Sexual Activity    Alcohol use: Not Currently     Alcohol/week: 2.0 standard drinks of alcohol     Comment: No    Drug use: Never    Sexual activity: Yes     Partners: Male     Birth control/protection: Vasectomy     Comment:  vasectomy       Review of Systems   Constitutional:  Negative for chills and fever.   HENT:  Positive for trouble swallowing.    Gastrointestinal:  Positive for abdominal pain, GERD and indigestion. Negative for abdominal distention, nausea and vomiting.        Vital Signs:   /74 (BP Location: Right arm, Patient Position: Sitting, Cuff Size: Adult)   Pulse 52   Ht 162.6 cm (64\")   Wt 79.4 kg (175 lb)   BMI 30.04 kg/m²      Physical Exam  Vitals and nursing note reviewed.   Constitutional:       General: She is not in acute distress.     Appearance: Normal appearance. She is not ill-appearing.   HENT:      Head: Normocephalic and atraumatic.   Cardiovascular:      Rate and Rhythm: Normal rate.   Pulmonary:      Effort: Pulmonary effort is normal.      Breath sounds: No stridor.   Abdominal:      Palpations: Abdomen is soft.      Tenderness: There is no guarding.   Musculoskeletal:         General: No deformity. Normal range of motion.   Skin:     General: Skin is warm and dry.      Coloration: Skin is not jaundiced.   Neurological:      General: No focal deficit present.      Mental Status: She is alert and oriented to person, place, and time.   Psychiatric:         Mood and Affect: Mood normal.         Thought Content: Thought content normal.          Result Review :          []  Laboratory  []  Radiology  []  Pathology  []  Microbiology  []  EKG/Telemetry   []  " Cardiology/Vascular   []  Old records  I spent 25 minutes caring for Breanna on this date of service. This time includes time spent by me in the following activities: reviewing tests, obtaining and/or reviewing a separately obtained history, performing a medically appropriate examination and/or evaluation, ordering medications, tests, or procedures, and documenting information in the medical record        Assessment and Plan    Diagnoses and all orders for this visit:    1. Dysphagia, unspecified type (Primary)    2. Epigastric pain        Follow Up   Return for Schedule EGD with Dr. Oh on 12/3/2024 [facility].    Hospital arrival time: 11:30.    Possible risks/complications, benefits, and alternatives to surgical or invasive procedures have been explained to patient and/or legal guardian.    Patient has been evaluated and can tolerate anesthesia and/or sedation. Risks, benefits, and alternatives to anesthesia and sedation have been explained to the patient and/or legal guardian. Patient verbalizes understanding and is willing to proceed with the above plan.     Patient was given instructions and counseling regarding her condition or for health maintenance advice. Please see specific information pulled into the AVS if appropriate.     Thank you for allowing me to participate in the care of this patient. Please call with questions or concerns.

## 2024-11-05 ENCOUNTER — TELEPHONE (OUTPATIENT)
Dept: ORTHOPEDIC SURGERY | Facility: CLINIC | Age: 44
End: 2024-11-05
Payer: OTHER GOVERNMENT

## 2024-11-05 NOTE — TELEPHONE ENCOUNTER
[facility] received a fax that requires your attention. The document has been indexed to the patient’s chart for your review.      Reason for sending: RECEIVED FAX FROM [employer] STATING THAT PRP IS NO LONGER A COVERED BENEFIT    Documents Description: [employer]-PRP DENIAL-10/31/2024    Name of Sender: [employer]    Date Indexed: 11/5/2024

## 2024-11-13 ENCOUNTER — OFFICE VISIT (OUTPATIENT)
Dept: OBSTETRICS AND GYNECOLOGY | Facility: CLINIC | Age: 44
End: 2024-11-13
Payer: OTHER GOVERNMENT

## 2024-11-13 VITALS
SYSTOLIC BLOOD PRESSURE: 138 MMHG | HEART RATE: 64 BPM | BODY MASS INDEX: 30.9 KG/M2 | WEIGHT: 180 LBS | DIASTOLIC BLOOD PRESSURE: 79 MMHG

## 2024-11-13 DIAGNOSIS — R10.2 PELVIC PRESSURE IN FEMALE: ICD-10-CM

## 2024-11-13 DIAGNOSIS — Z01.411 ENCOUNTER FOR GYNECOLOGICAL EXAMINATION WITH ABNORMAL FINDING: Primary | ICD-10-CM

## 2024-11-13 PROCEDURE — G0123 SCREEN CERV/VAG THIN LAYER: HCPCS | Performed by: NURSE PRACTITIONER

## 2024-11-13 PROCEDURE — 87624 HPV HI-RISK TYP POOLED RSLT: CPT | Performed by: NURSE PRACTITIONER

## 2024-11-13 NOTE — PROGRESS NOTES
Well Woman Visit    CC: Scheduled annual well gyn visit  Chief Complaint   Patient presents with    Gynecologic Exam     Adenomyosis of the uterus ANNUAL         HPI:   44 y.o.   Social History     Substance and Sexual Activity   Sexual Activity Yes    Partners: Male    Birth control/protection: Vasectomy    Comment:  vasectomy       Menses:   q 24 days, lasts 4-5 days, changes products q 3 hrs on heaviest days.   Pain with menses:  Moderate, not too bad      PCP: does manage PMHx and preventative labs  History: PMHx, Meds, Allergies, PSHx, Social Hx, and POBHx all reviewed and updated.    Has a history of adenomyosis, is feeling increased pelvic pressure.  Does not want to have hysterectomy if she doesn't have too. Saw a private nurse practitioner who recommended hormone therapy, is unsure if she wants to use.        PHYSICAL EXAM:  /79   Pulse 64   Wt 81.6 kg (180 lb)   LMP 10/25/2024 (Approximate)   Breastfeeding No   BMI 30.90 kg/m²  Not found.     Exam conducted with a chaperone present  General- NAD, alert and oriented, appropriate  Psych- Normal mood, good memory  Neck- No masses, no thyroid enlargement  CV- Regular rhythm, no murnurs  Resp- CTA to bases, no wheezes  Abdomen- Soft, non distended, non tender, no masses    Breast left-  Bilaterally symmetrical, no masses, non tender, no nipple discharge  Breast right- Bilaterally symmetrical, no masses, non tender, no nipple discharge    External genitalia- Normal female, no lesions  Urethra/meatus- Normal, no masses, non tender  Bladder- Normal, no masses, non tender  Vagina- Normal, no atrophy, no lesions, no discharge.  Prolapse : none noted   Cvx- Normal, no lesions, no discharge, No cervical motion tenderness  Uterus- Normal size, shape & consistency.  Non tender, mobile.  Adnexa- No mass, non tender  Anus/Rectum/Perineum- Not performed    Lymphatic- No palpable neck, axillary, or groin nodes  Ext- No edema, no cyanosis    Skin- No  lesions, no rashes, no acanthosis nigricans      ASSESSMENT and PLAN:    Diagnoses and all orders for this visit:    1. Encounter for gynecological examination with abnormal finding (Primary)  -     IgP, Aptima HPV    2. Pelvic pressure in female  Assessment & Plan:  Reports hx of adenomyosis and worsening pelvic pressure.  Will check ultrasound.    Orders:  -     US Non-ob Transvaginal; Future        Preventative:  BREAST HEALTH- Monthly self breast exam importance and how to reviewed. MMG and/or MRI (prn) reviewed per society guidelines and her individual history. Screen: Already up to date  CERVICAL CANCER Screening- Reviewed current ASCCP guidelines for screening w and wo cotest HPV, age specific.  Screen: Updated today  COLON CANCER Screening- Reviewed current medical society guidelines and options.  Screen:  Already up to date  SEXUAL HEALTH: Declines STD screening  Smoking status- NON SMOKER/VAPER    HRT/ERT- newer (as of 2024) evidence, along with an in-depth review of WHI results, supports the use of iso-molecular (identical to human) estradiol for treatment of vasomotor symptoms and osteoporosis prevention without increasing risk of cardiovascular disease.  Starting within 10years of menopause and before 61yo is ideal. For patients who have not had a hysterectomy, the addition of iso-molecular micronized progesterone further enhances bone mineral density without an increase in breast cancer incidence or mortality.  Atherosclerotic cardiovascular disease (ASCVD) risk calculated today 0.4%.  Typically if ASCVD risk is 19% or more, HRT is not recommended.   The 10-year ASCVD risk score (Ambreen MATTHEWS, et al., 2019) is: 0.4%    Values used to calculate the score:      Age: 44 years      Sex: Female      Is Non- : No      Diabetic: No      Tobacco smoker: No      Systolic Blood Pressure: 138 mmHg      Is BP treated: No      HDL Cholesterol: 78 mg/dL      Total Cholesterol: 177  mg/dL    Discussed as she is still having a regular cycle, a low-dose birth control pill would be recommended as first line therapy.  She understands the importance of having any ordered tests to be performed in a timely fashion.  The risks of not performing them include, but are not limited to, advanced cancer stages, bone loss from osteoporosis and/or subsequent increase in morbidity and/or mortality.  She is encouraged to review her results online and/or contact or office if she has questions.     Follow Up:  Return in about 1 year (around 11/13/2025) for Annual physical and as needed after ultrasound.        Wilson Alvarez, APRN  11/13/2024    [facility] [address]  [facility]  [address] [address]  Dept: [phone]  Dept Fax: [phone]  Loc: [phone]

## 2024-11-19 LAB
CYTOLOGIST CVX/VAG CYTO: NORMAL
CYTOLOGY CVX/VAG DOC CYTO: NORMAL
CYTOLOGY CVX/VAG DOC THIN PREP: NORMAL
DX ICD CODE: NORMAL
HPV I/H RISK 4 DNA CVX QL PROBE+SIG AMP: NEGATIVE
Lab: NORMAL
OTHER STN SPEC: NORMAL
STAT OF ADQ CVX/VAG CYTO-IMP: NORMAL

## 2024-11-22 NOTE — PRE-PROCEDURE INSTRUCTIONS
"Instructed on date and arrival time of 1130. Instructed that arrival time is not procedure time but allows time to prepare for procedure. Come to entrance \"C\".  Must have  over age 18 to drive home.  May have two visitors; however, children under 12 must stay in waiting room.  Discussed diet/NPO.  May take medications as usual except for blood thinners, diabetic medications, or weight loss medications.  Verbalized understanding of instructions given.  Instructed to call for questions or concerns.  "

## 2024-11-29 ENCOUNTER — ANESTHESIA EVENT (OUTPATIENT)
Dept: GASTROENTEROLOGY | Facility: HOSPITAL | Age: 44
End: 2024-11-29
Payer: OTHER GOVERNMENT

## 2024-11-29 NOTE — ANESTHESIA PREPROCEDURE EVALUATION
" Anesthesia Evaluation     Patient summary reviewed and Nursing notes reviewed   NPO Solid Status: > 8 hours  NPO Liquid Status: > 4 hours           Airway   Mallampati: II  TM distance: >3 FB  Neck ROM: full  No difficulty expected  Dental          Pulmonary     breath sounds clear to auscultation  Cardiovascular     Rhythm: regular      PE comment: Tc 50s     Neuro/Psych  (+) headaches  GI/Hepatic/Renal/Endo    (+) GERD    ROS Comment: 1999: TONSILLECTOMY     Musculoskeletal         ROS comment: Right quad tendon repair 3/26/23   Abdominal    Substance History      OB/GYN      Comment: PCOS, endometriosis    HCG negative 12/3/24       Other   arthritis,       Other Comment: Anemia   2023: Osteoarthritis  ROS/Med Hx Other: Patient is a  and stated \"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 Transient laryngeal penetration with nectar and thin liquid barium  2. Degenerative changes in the C4-C7 cervical spine                 Anesthesia Plan    ASA 1     general   total IV anesthesia  (Total IV Anesthesia    Patient understands anesthesia not responsible for dental damage.    Discussed risks with pt including aspiration, allergic reactions, apnea, advanced airway placement. Pt verbalized understanding. All questions answered. )  intravenous induction     Anesthetic plan, risks, benefits, and alternatives have been provided, discussed and informed consent has been obtained with: patient.    Plan discussed with CRNA.      CODE STATUS:         " Other

## 2024-12-03 ENCOUNTER — HOSPITAL ENCOUNTER (OUTPATIENT)
Facility: HOSPITAL | Age: 44
Setting detail: HOSPITAL OUTPATIENT SURGERY
Discharge: HOME | End: 2024-12-03
Attending: SURGERY | Admitting: SURGERY
Payer: OTHER GOVERNMENT

## 2024-12-03 ENCOUNTER — ANESTHESIA (OUTPATIENT)
Dept: GASTROENTEROLOGY | Facility: HOSPITAL | Age: 44
End: 2024-12-03
Payer: OTHER GOVERNMENT

## 2024-12-03 VITALS
TEMPERATURE: 95.9 F | HEART RATE: 55 BPM | BODY MASS INDEX: 29.82 KG/M2 | OXYGEN SATURATION: 97 % | SYSTOLIC BLOOD PRESSURE: 115 MMHG | DIASTOLIC BLOOD PRESSURE: 85 MMHG | RESPIRATION RATE: 18 BRPM | WEIGHT: 173.72 LBS

## 2024-12-03 DIAGNOSIS — R13.11 ORAL PHASE DYSPHAGIA: ICD-10-CM

## 2024-12-03 LAB — B-HCG UR QL: NEGATIVE

## 2024-12-03 PROCEDURE — 81025 URINE PREGNANCY TEST: CPT | Performed by: SURGERY

## 2024-12-03 PROCEDURE — 25010000002 PROPOFOL 10 MG/ML EMULSION: Performed by: NURSE ANESTHETIST, CERTIFIED REGISTERED

## 2024-12-03 PROCEDURE — 25010000002 LIDOCAINE PF 2% 2 % SOLUTION: Performed by: NURSE ANESTHETIST, CERTIFIED REGISTERED

## 2024-12-03 PROCEDURE — 88305 TISSUE EXAM BY PATHOLOGIST: CPT | Performed by: SURGERY

## 2024-12-03 RX ORDER — SODIUM CHLORIDE 0.9 % (FLUSH) 0.9 %
10 SYRINGE (ML) INJECTION AS NEEDED
Status: DISCONTINUED | OUTPATIENT
Start: 2024-12-03 | End: 2024-12-03 | Stop reason: HOSPADM

## 2024-12-03 RX ORDER — PROPOFOL 10 MG/ML
VIAL (ML) INTRAVENOUS AS NEEDED
Status: DISCONTINUED | OUTPATIENT
Start: 2024-12-03 | End: 2024-12-03 | Stop reason: SURG

## 2024-12-03 RX ORDER — SODIUM CHLORIDE 9 MG/ML
40 INJECTION, SOLUTION INTRAVENOUS AS NEEDED
Status: DISCONTINUED | OUTPATIENT
Start: 2024-12-03 | End: 2024-12-03 | Stop reason: HOSPADM

## 2024-12-03 RX ORDER — LIDOCAINE HYDROCHLORIDE 20 MG/ML
INJECTION, SOLUTION EPIDURAL; INFILTRATION; INTRACAUDAL; PERINEURAL AS NEEDED
Status: DISCONTINUED | OUTPATIENT
Start: 2024-12-03 | End: 2024-12-03 | Stop reason: SURG

## 2024-12-03 RX ORDER — SODIUM CHLORIDE 0.9 % (FLUSH) 0.9 %
3 SYRINGE (ML) INJECTION EVERY 12 HOURS SCHEDULED
Status: DISCONTINUED | OUTPATIENT
Start: 2024-12-03 | End: 2024-12-03 | Stop reason: HOSPADM

## 2024-12-03 RX ADMIN — LIDOCAINE HYDROCHLORIDE 40 MG: 20 INJECTION, SOLUTION INTRAVENOUS at 12:31

## 2024-12-03 RX ADMIN — PROPOFOL 250 MCG/KG/MIN: 10 INJECTION, EMULSION INTRAVENOUS at 12:31

## 2024-12-03 RX ADMIN — PROPOFOL 100 MG: 10 INJECTION, EMULSION INTRAVENOUS at 12:31

## 2024-12-03 NOTE — ANESTHESIA POSTPROCEDURE EVALUATION
Patient: Breanna Corona    Procedure Summary       Date: 12/03/24 Room / Location: [facility] ENDOSCOPY 1 / [facility] ENDOSCOPY    Anesthesia Start: 1229 Anesthesia Stop: 1245    Procedure: ESOPHAGOGASTRODUODENOSCOPY with biopsies Diagnosis:       Oral phase dysphagia      (Oral phase dysphagia [R13.11])    Surgeons: Kareem Oh MD Provider: Alexandrea Zhou CRNA    Anesthesia Type: general ASA Status: 1            Anesthesia Type: general    Vitals  Vitals Value Taken Time   /69 12/03/24 1254   Temp 36.8 °C (98.2 °F) 12/03/24 1243   Pulse 54 12/03/24 1255   Resp 17 12/03/24 1253   SpO2 97 % 12/03/24 1255   Vitals shown include unfiled device data.        Post Anesthesia Care and Evaluation    Patient location during evaluation: bedside  Patient participation: complete - patient participated  Level of consciousness: awake  Pain management: adequate    Airway patency: patent  Anesthetic complications: No anesthetic complications  PONV Status: controlled  Cardiovascular status: acceptable and stable  Respiratory status: acceptable

## 2024-12-03 NOTE — H&P
General Surgery/Colorectal Surgery Note    Patient Name:  Breanna Corona  YOB: 1980  [id number]    Referring Provider: Nelly Connell APRN      Patient Care Team:  Mar Cunha APRN as PCP - General (Nurse Practitioner)    Subjective .     History of present illness:    HPI    referral from Joya Josue for dysphagia.  Patient reports that over the last several months she has been having trouble swallowing.  She reports intermittent heartburn and indigestion, was recently started on omeprazole.  Patient reports she feels like she has a lump in the back of her throat.  She admits to epigastric pain.  Denies any melena.  Admits to pain after eating.  Denies any family history of esophageal or stomach cancer.       History:  Past Medical History:   Diagnosis Date    Anemia 9/10/2024    Macrostatic    Endometriosis 2017    Adenomyosis via pelvic ultrasound    Migraine 2017    Occasional    Osteoarthritis 2023    Ovarian cyst 2011    PMS (premenstrual syndrome)     Polycystic ovary syndrome Want to check recent blood work suggests    Want to check on ultrasound progression       Past Surgical History:   Procedure Laterality Date    QUADRICEPS TENDON REPAIR Right 03/26/2023    Procedure: QUADRICEPS TENDON REPAIR;  Surgeon: Jaxon Aguillon MD;  Location: [facility];  Service: Orthopedics;  Laterality: Right;    TONSILLECTOMY  1999    WISDOM TOOTH EXTRACTION  1996       Family History   Problem Relation Age of Onset    Cancer Father         No    Arthritis Father         Hip    Diabetes Father         Type 11    Arthritis Mother         Spine    Breast cancer Paternal Grandmother        Social History     Tobacco Use    Smoking status: Never     Passive exposure: Never    Smokeless tobacco: Never    Tobacco comments:     No   Vaping Use    Vaping status: Never Used   Substance Use Topics    Alcohol use: Not Currently     Alcohol/week: 2.0 standard drinks of alcohol     Comment: No     Drug use: Never       Review of Systems: See HPI    Review of Systems            Medications Prior to Admission   Medication Sig Dispense Refill Last Dose/Taking    cetirizine (zyrTEC) 10 MG tablet Take 1 tablet by mouth Daily.       Magnesium Hydroxide (MAGNESIA PO) Take  by mouth.       Misc Natural Products (CORTISOL PO) Take  by mouth. Cortisol Support       multivitamin with minerals tablet tablet Take 1 tablet by mouth Daily.       Omega-3 Fatty Acids (fish oil) 1000 MG capsule capsule Take  by mouth Daily With Breakfast.       THYROID PO Take  by mouth. Thyroid Support       Vitamin D, Cholecalciferol, (CHOLECALCIFEROL) 10 MCG (400 UNIT) tablet Take 1 tablet by mouth Daily.            Home Meds:      Prior to Admission medications    Medication Sig Start Date End Date Taking? Authorizing Provider   cetirizine (zyrTEC) 10 MG tablet Take 1 tablet by mouth Daily.    Gris Taylor MD   Magnesium Hydroxide (MAGNESIA PO) Take  by mouth.    Gris Taylor MD   Misc Natural Products (CORTISOL PO) Take  by mouth. Cortisol Support    Gris Taylor MD   multivitamin with minerals tablet tablet Take 1 tablet by mouth Daily.    Gris Taylor MD   Omega-3 Fatty Acids (fish oil) 1000 MG capsule capsule Take  by mouth Daily With Breakfast.    Gris Taylor MD   THYROID PO Take  by mouth. Thyroid Support    Gris Taylor MD   Vitamin D, Cholecalciferol, (CHOLECALCIFEROL) 10 MCG (400 UNIT) tablet Take 1 tablet by mouth Daily.    Gris Taylor MD            Allergies:  Patient has no known allergies.      Objective     Vital Signs        Physical Exam:     Physical Exam    NAD, A/O x 4, normal circulation, normal respiration      Result Review :Labs  Result Review  Imaging  Med Tab  Media    Assessment & Plan     Diagnoses and all orders for this visit:    1. Oral phase dysphagia  -     sodium chloride 0.9 % flush 3 mL  -     sodium chloride 0.9 % flush 10 mL  -      sodium chloride 0.9 % infusion 40 mL    Other orders  -     Follow Anesthesia Guidelines / Protocol; Standing  -     Pregnancy, Urine - Urine, Clean Catch; Standing  -     Continuous Pulse Oximetry; Standing  -     POC Glucose Once; Standing  -     Insert Peripheral IV; Standing  -     Continuous Pulse Oximetry  -     POC Glucose Once  -     Insert Peripheral IV  -     Follow Anesthesia Guidelines / Protocol; Standing  -     hCG, Serum, Qualitative; Standing  -     Insert Peripheral IV; Standing  -     Saline Lock & Maintain IV Access; Standing  -     Place Sequential Compression Device; Standing  -     Maintain Sequential Compression Device; Standing  -     Verify NPO Status; Standing  -     Follow Anesthesia Guidelines / Protocol  -     Pregnancy, Urine - Urine, Clean Catch  -     Follow Anesthesia Guidelines / Protocol  -     hCG, Serum, Qualitative  -     Insert Peripheral IV  -     Saline Lock & Maintain IV Access  -     Place Sequential Compression Device  -     Maintain Sequential Compression Device  -     Verify NPO Status           Risks including bleeding, perforation, pain, infection, missed polyp(s). Benefits, and alternatives of EGD discussed with patient.  All questions answered.  Consent verified.         Kareem Oh MD  12/03/24 11:39 EST

## 2024-12-04 LAB
CYTO UR: NORMAL
LAB AP CASE REPORT: NORMAL
LAB AP CLINICAL INFORMATION: NORMAL
PATH REPORT.FINAL DX SPEC: NORMAL
PATH REPORT.GROSS SPEC: NORMAL

## 2024-12-10 ENCOUNTER — HOSPITAL ENCOUNTER (OUTPATIENT)
Dept: ULTRASOUND IMAGING | Facility: HOSPITAL | Age: 44
Discharge: HOME | End: 2024-12-10
Admitting: NURSE PRACTITIONER
Payer: OTHER GOVERNMENT

## 2024-12-10 DIAGNOSIS — R10.2 PELVIC PRESSURE IN FEMALE: ICD-10-CM

## 2024-12-10 PROCEDURE — 76830 TRANSVAGINAL US NON-OB: CPT

## 2024-12-17 ENCOUNTER — TELEPHONE (OUTPATIENT)
Dept: OBSTETRICS AND GYNECOLOGY | Facility: CLINIC | Age: 44
End: 2024-12-17
Payer: OTHER GOVERNMENT

## 2024-12-17 NOTE — TELEPHONE ENCOUNTER
----- Message from Wilson Alvarez sent at 12/16/2024 10:06 AM EST -----  Please let patient know her ultrasound is normal.  If pelvic pressure continues to worsen, recommend follow up appointment.

## 2025-08-18 ENCOUNTER — TRANSCRIBE ORDERS (OUTPATIENT)
Dept: FAMILY MEDICINE CLINIC | Facility: CLINIC | Age: 45
End: 2025-08-18
Payer: OTHER GOVERNMENT

## 2025-08-18 DIAGNOSIS — Z12.31 BREAST CANCER SCREENING BY MAMMOGRAM: Primary | ICD-10-CM

## (undated) DEVICE — CONN JET HYDRA H20 AUXILIARY DISP

## (undated) DEVICE — PENCL E/S SMOKEEVAC W/TELESCP CANN

## (undated) DEVICE — DRSNG SURG AQUACEL AG/ADVNTGE 9X25CM 3.5X10IN

## (undated) DEVICE — Device: Brand: DEFENDO AIR/WATER/SUCTION AND BIOPSY VALVE

## (undated) DEVICE — APPL CHLORAPREP HI/LITE 26ML ORNG

## (undated) DEVICE — SUT VIC UD BR COAT 0 CP2 27IN

## (undated) DEVICE — INTENDED FOR TISSUE SEPARATION, AND OTHER PROCEDURES THAT REQUIRE A SHARP SURGICAL BLADE TO PUNCTURE OR CUT.: Brand: BARD-PARKER ® CARBON RIB-BACK BLADES

## (undated) DEVICE — STERILE POLYISOPRENE POWDER-FREE SURGICAL GLOVES WITH EMOLLIENT COATING: Brand: PROTEXIS

## (undated) DEVICE — STERILE POLYISOPRENE POWDER-FREE SURGICAL GLOVES: Brand: PROTEXIS

## (undated) DEVICE — Device: Brand: BLACK EYE

## (undated) DEVICE — UNDYED BRAIDED (POLYGLACTIN 910), SYNTHETIC ABSORBABLE SUTURE: Brand: COATED VICRYL

## (undated) DEVICE — SOL IRR H2O BO 1000ML STRL

## (undated) DEVICE — Device

## (undated) DEVICE — DRP SURG U/DRP INVISISHIELD PA 48X52IN

## (undated) DEVICE — BNDG ESMARK STRL 6INX12FT LF

## (undated) DEVICE — SOLIDIFIER LIQLOC PLS 1500CC BT

## (undated) DEVICE — BLCK/BITE BLOX WO/DENTL/RIM W/STRAP 54F

## (undated) DEVICE — GLV SURG SENSICARE PI ORTHO SZ8 LF STRL

## (undated) DEVICE — LINER SURG CANSTR SXN S/RIGD 1500CC

## (undated) DEVICE — GLV SURG SENSICARE SLT PF LF 6 STRL

## (undated) DEVICE — UNDERCAST PADDING: Brand: DEROYAL

## (undated) DEVICE — SINGLE-USE BIOPSY FORCEPS: Brand: RADIAL JAW 4

## (undated) DEVICE — PROXIMATE RH ROTATING HEAD SKIN STAPLERS (35 WIDE) CONTAINS 35 STAINLESS STEEL STAPLES: Brand: PROXIMATE

## (undated) DEVICE — SOL IRRG H2O PL/BG 1000ML STRL

## (undated) DEVICE — KNEE IMMOBILIZER: Brand: DEROYAL

## (undated) DEVICE — BNDG ELAS CO-FLEX SLF ADHR 6IN 5YD LF STRL

## (undated) DEVICE — SUT ETHIB 1 X538H ETX538H

## (undated) DEVICE — EXTREMITY-LF: Brand: MEDLINE INDUSTRIES, INC.

## (undated) DEVICE — SPONGE,LAP,12"X12",XR,ST,5/PK,40PK/CS: Brand: MEDLINE